# Patient Record
Sex: MALE | Race: WHITE
[De-identification: names, ages, dates, MRNs, and addresses within clinical notes are randomized per-mention and may not be internally consistent; named-entity substitution may affect disease eponyms.]

---

## 2017-01-02 ENCOUNTER — HOSPITAL ENCOUNTER (OUTPATIENT)
Dept: HOSPITAL 47 - EC | Age: 62
Setting detail: OBSERVATION
LOS: 1 days | Discharge: HOME | End: 2017-01-03
Payer: MEDICARE

## 2017-01-02 VITALS — BODY MASS INDEX: 25.7 KG/M2

## 2017-01-02 DIAGNOSIS — J45.909: ICD-10-CM

## 2017-01-02 DIAGNOSIS — Z79.891: ICD-10-CM

## 2017-01-02 DIAGNOSIS — I10: ICD-10-CM

## 2017-01-02 DIAGNOSIS — F12.90: ICD-10-CM

## 2017-01-02 DIAGNOSIS — Z98.84: ICD-10-CM

## 2017-01-02 DIAGNOSIS — B19.20: ICD-10-CM

## 2017-01-02 DIAGNOSIS — Z87.891: ICD-10-CM

## 2017-01-02 DIAGNOSIS — K40.30: Primary | ICD-10-CM

## 2017-01-02 DIAGNOSIS — Z79.899: ICD-10-CM

## 2017-01-02 LAB
ALP SERPL-CCNC: 79 U/L (ref 38–126)
ALT SERPL-CCNC: 38 U/L (ref 21–72)
ANION GAP SERPL CALC-SCNC: 11 MMOL/L
AST SERPL-CCNC: 31 U/L (ref 17–59)
BASOPHILS # BLD AUTO: 0.1 K/UL (ref 0–0.2)
BASOPHILS NFR BLD AUTO: 1 %
BUN SERPL-SCNC: 15 MG/DL (ref 9–20)
CALCIUM SPEC-MCNC: 8.8 MG/DL (ref 8.4–10.2)
CH: 30.9
CHCM: 33.8
CHLORIDE SERPL-SCNC: 106 MMOL/L (ref 98–107)
CO2 SERPL-SCNC: 26 MMOL/L (ref 22–30)
EOSINOPHIL # BLD AUTO: 0.2 K/UL (ref 0–0.7)
EOSINOPHIL NFR BLD AUTO: 3 %
ERYTHROCYTE [DISTWIDTH] IN BLOOD BY AUTOMATED COUNT: 4.43 M/UL (ref 4.3–5.9)
ERYTHROCYTE [DISTWIDTH] IN BLOOD: 13.3 % (ref 11.5–15.5)
GLUCOSE SERPL-MCNC: 96 MG/DL (ref 74–99)
HCT VFR BLD AUTO: 40.8 % (ref 39–53)
HDW: 2.44
HGB BLD-MCNC: 13.3 GM/DL (ref 13–17.5)
LUC NFR BLD AUTO: 1 %
LYMPHOCYTES # SPEC AUTO: 0.8 K/UL (ref 1–4.8)
LYMPHOCYTES NFR SPEC AUTO: 13 %
MCH RBC QN AUTO: 30.1 PG (ref 25–35)
MCHC RBC AUTO-ENTMCNC: 32.7 G/DL (ref 31–37)
MCV RBC AUTO: 92 FL (ref 80–100)
MONOCYTES # BLD AUTO: 0.3 K/UL (ref 0–1)
MONOCYTES NFR BLD AUTO: 4 %
NEUTROPHILS # BLD AUTO: 5.2 K/UL (ref 1.3–7.7)
NEUTROPHILS NFR BLD AUTO: 78 %
NON-AFRICAN AMERICAN GFR(MDRD): >60
POTASSIUM SERPL-SCNC: 4.1 MMOL/L (ref 3.5–5.1)
PROT SERPL-MCNC: 6.4 G/DL (ref 6.3–8.2)
SODIUM SERPL-SCNC: 143 MMOL/L (ref 137–145)
WBC # BLD AUTO: 0.09 10*3/UL
WBC # BLD AUTO: 6.6 K/UL (ref 3.8–10.6)
WBC (PEROX): 7.15

## 2017-01-02 PROCEDURE — 83605 ASSAY OF LACTIC ACID: CPT

## 2017-01-02 PROCEDURE — 80053 COMPREHEN METABOLIC PANEL: CPT

## 2017-01-02 PROCEDURE — 88302 TISSUE EXAM BY PATHOLOGIST: CPT

## 2017-01-02 PROCEDURE — 49650 LAP ING HERNIA REPAIR INIT: CPT

## 2017-01-02 PROCEDURE — 85025 COMPLETE CBC W/AUTO DIFF WBC: CPT

## 2017-01-02 PROCEDURE — 74020: CPT

## 2017-01-02 PROCEDURE — 96374 THER/PROPH/DIAG INJ IV PUSH: CPT

## 2017-01-02 PROCEDURE — 99285 EMERGENCY DEPT VISIT HI MDM: CPT

## 2017-01-02 PROCEDURE — 76882 US LMTD JT/FCL EVL NVASC XTR: CPT

## 2017-01-02 PROCEDURE — 96375 TX/PRO/DX INJ NEW DRUG ADDON: CPT

## 2017-01-02 NOTE — XR
EXAMINATION TYPE: XR abdomen 2V

 

DATE OF EXAM: 1/2/2017 8:03 PM

 

COMPARISON: NONE

 

HISTORY: Right-sided groin pain

 

TECHNIQUE: 3 views, upright and supine

 

FINDINGS: There is no pneumoperitoneum or definite pneumatosis. Gas distended loops of bowel are seen
 in all 4 quadrants, but no definite bowel obstruction. No gas filled bowel loop is noted over the ri
ght groin position.

 

Visualized lung bases and pleural spaces are negative.

 

IMPRESSION: NO DEFINITE ACUTE FINDINGS, NONSPECIFIC SCATTERED FINDINGS AS DESCRIBED.

## 2017-01-02 NOTE — US
EXAMINATION TYPE: US groin extremity RT

 

DATE OF EXAM: 1/2/2017 6:25 PM

 

COMPARISON: NONE

 

CLINICAL HISTORY: Patient states known hernia from 1 year ago that he was able to push back in, lifti
ng weight today and felt "pop" again. Patient is pain with obvious right inguinal bulge.

 

TECHNOLOGIST IMPRESSION:  4.7cm probable hernia with a fluid component seen. Valsalva does not change
 appearance at all, area never moved and no peristalsing seen. With fluid noted I question strangulat
ed hernia within right groin

 

IMPRESSION:  Positive study suggesting incarcerated bowel loop. Would suggest confirmation with CT.

## 2017-01-03 VITALS — RESPIRATION RATE: 16 BRPM

## 2017-01-03 VITALS — TEMPERATURE: 98.2 F | SYSTOLIC BLOOD PRESSURE: 125 MMHG | HEART RATE: 85 BPM | DIASTOLIC BLOOD PRESSURE: 76 MMHG

## 2017-01-03 LAB
ALP SERPL-CCNC: 65 U/L (ref 38–126)
ALT SERPL-CCNC: 34 U/L (ref 21–72)
ANION GAP SERPL CALC-SCNC: 9 MMOL/L
AST SERPL-CCNC: 24 U/L (ref 17–59)
BASOPHILS # BLD AUTO: 0 K/UL (ref 0–0.2)
BASOPHILS NFR BLD AUTO: 0 %
BUN SERPL-SCNC: 12 MG/DL (ref 9–20)
CALCIUM SPEC-MCNC: 8.3 MG/DL (ref 8.4–10.2)
CH: 30.3
CHCM: 32.4
CHLORIDE SERPL-SCNC: 105 MMOL/L (ref 98–107)
CO2 SERPL-SCNC: 30 MMOL/L (ref 22–30)
EOSINOPHIL # BLD AUTO: 0.2 K/UL (ref 0–0.7)
EOSINOPHIL NFR BLD AUTO: 4 %
ERYTHROCYTE [DISTWIDTH] IN BLOOD BY AUTOMATED COUNT: 4.16 M/UL (ref 4.3–5.9)
ERYTHROCYTE [DISTWIDTH] IN BLOOD: 13.1 % (ref 11.5–15.5)
GLUCOSE SERPL-MCNC: 88 MG/DL (ref 74–99)
HCT VFR BLD AUTO: 39.1 % (ref 39–53)
HDW: 2.37
HGB BLD-MCNC: 12.8 GM/DL (ref 13–17.5)
LUC NFR BLD AUTO: 2 %
LYMPHOCYTES # SPEC AUTO: 1.6 K/UL (ref 1–4.8)
LYMPHOCYTES NFR SPEC AUTO: 25 %
MCH RBC QN AUTO: 30.7 PG (ref 25–35)
MCHC RBC AUTO-ENTMCNC: 32.7 G/DL (ref 31–37)
MCV RBC AUTO: 93.9 FL (ref 80–100)
MONOCYTES # BLD AUTO: 0.3 K/UL (ref 0–1)
MONOCYTES NFR BLD AUTO: 4 %
NEUTROPHILS # BLD AUTO: 4.1 K/UL (ref 1.3–7.7)
NEUTROPHILS NFR BLD AUTO: 65 %
NON-AFRICAN AMERICAN GFR(MDRD): >60
POTASSIUM SERPL-SCNC: 4.5 MMOL/L (ref 3.5–5.1)
PROT SERPL-MCNC: 5.7 G/DL (ref 6.3–8.2)
SODIUM SERPL-SCNC: 144 MMOL/L (ref 137–145)
WBC # BLD AUTO: 0.12 10*3/UL
WBC # BLD AUTO: 6.3 K/UL (ref 3.8–10.6)
WBC (PEROX): 6.69

## 2017-01-03 RX ADMIN — POTASSIUM CHLORIDE ONE MLS: 14.9 INJECTION, SOLUTION INTRAVENOUS at 14:13

## 2017-01-03 RX ADMIN — POTASSIUM CHLORIDE ONE MLS: 14.9 INJECTION, SOLUTION INTRAVENOUS at 15:18

## 2017-01-03 RX ADMIN — POTASSIUM CHLORIDE ONE: 14.9 INJECTION, SOLUTION INTRAVENOUS at 17:15

## 2017-01-03 NOTE — P.PCN
Date of Procedure: 01/03/17


Preoperative Diagnosis: 


Right incarcerated inguinal hernia, initial


Postoperative Diagnosis: 


Right strangulated direct inguinal hernia, initial


Procedure(s) Performed: 


Laparoscopic reduction of strangulated right inguinal hernia, repair without 

mesh; partial omentectomy


Anesthesia: GETA, local


Surgeon: Larissa Alvarez


Estimated Blood Loss (ml): 20


Pathology: other (Strangulate a right inguinal hernia sac with omentum)


Condition: stable


Disposition: floor


Operative Findings: 


Incarcerated right inguinal hernia with omentum, no bowel involvement, 

strangulated right inguinal hernia direct containing omentum resected.  Mesh 

repair deferred secondary to contamination of case from strangulated tissue.

## 2017-01-03 NOTE — P.PN
Progress Note - Text





Patient is clinically doing well this evening.  "I feel great."  Patient may be 

discharged.  He is on a pain contract.  He will obtain his pain medication and 

narcotics from his primary care provider.

## 2017-01-03 NOTE — P.DS
Providers


Date of admission: 


01/02/17 19:54





Expected date of discharge: 01/03/17


Attending physician: 


Larissa Alvarez





Primary care physician: 


Kwan Montes








- Discharge Diagnosis(es)


(1) Strangulated inguinal hernia


Status: Acute   





(2) Chronic pain syndrome


Status: Chronic   





(3) Hypertensive heart disease


Status: Chronic   





(4) Right groin pain


Status: Acute   


Hospital Course: 








POSTOPERATIVE DIAGNOSES:


1. Right lower quadrant abdominal pain.


2. Right incarcerated inguinal hernia, initial, direct.


3. Essential hypertension.


4. Attention deficit hyperactivity disorder.


5. Right strangulated inguinal hernia, initial with involvement of omentum.


6. Necrosis of greater omentum at right lower quadrant.





INDICATIONS: The patient is a 61-year-old male with acute swelling and known 

history of right inguinal hernia.  Despite attempts, the right inguinal hernia 

cannot be reduced.  He developed skin to just along the groin consistent which 

translated hernia.  Secondary to his clinical findings, urgent surgical 

intervention was sought.  Surgical options were


discussed including open versus laparoscopic technique and he elected


for laparoscopic technique. Benefits and risks including bleeding,


infection, recurrence, as well as chronic pain and placement of mesh were 

described. Informed consent was obtained. 


Pertinent Studies: 





Abdominal x-ray demonstrates no obstruction.





Scrotal ultrasound demonstrates right groin hernia possibly involving small 

bowel.


Procedures: 








OPERATION:


1. Laparoscopic reduction of strangulated right inguinal hernia.


2. Laparoscopic right inguinal hernia repair via transabdominal approach 

without mesh.


3. Partial omentectomy.








Patient Condition at Discharge: Stable





Plan - Discharge Summary


Discharge Medication List





Albuterol Inhaler [Ventolin Hfa Inhaler] 2 puff INHALATION RT-Q6H PRN 01/02/17 [

History]


Atenolol [Atenolol] 50 mg PO DAILY 01/02/17 [History]


Dextroamphetamine/Amphetamine [Adderall] 30 mg PO BID 01/02/17 [History]


Diazepam [Diazepam] 10 mg PO BID PRN 01/02/17 [History]


Hydrocodone/Acetaminophen [Hydrocodon-Acetaminophn ] 1 tab PO BID 01/02/ 17 [History]


oxyCODONE HCL 30 mg PO 5XD 01/02/17 [History]








Follow up Appointment(s)/Referral(s): 


Kwan Montes MD [Primary Care Provider] - 1-2 days (Office closed at time of 

discharge. Patient to call and schedule follow up appointment.)


Larissa Alvarez MD [STAFF PHYSICIAN] - 01/10/17 (Office closed. Patient to 

call and schedule follow up appointment.)


Patient Instructions/Handouts:  Laparoscopic Herniorrhaphy (DC), Inguinal 

Hernia Repair (DC)


Activity/Diet/Wound Care/Special Instructions: 


No lifting over 4 pounds in 4 weeks.


Discharge Disposition: HOME SELF-CARE

## 2017-01-03 NOTE — P.GSHP
History of Present Illness


H&P Date: 01/03/17








CHIEF COMPLAINT: Incarcerated right inguinal hernia.





HISTORY OF PRESENT ILLNESS: The patient is a 61-year-old male who presented 

with known history of right inguinal hernia. He has no previous history of 

prior groin surgery. He is a previuos gastric bypass patient from over 15 years 

ago. His highest weight was 350-lbs. He has maintained his weight loss of over 

150-lbs. He presented with acute swelling of the right groin and has been 

unable to reduce his hernia. He reports pain. He was admitted secondary to 

increased pain.  








PAST MEDICAL HISTORY: 


Please see list.





PAST SURGICAL HISTORY: 


Please see list.





MEDICATIONS: 


Please see list.





ALLERGIES:  Please see list. 





SOCIAL HISTORY: No illicit drug use





FAMILY HISTORY: No reports of Crohn disease or ulcerative colitis. 





REVIEW OF ORGAN SYSTEMS: 


CONSTITUTIONAL: No reports of fevers or chills.  





PHYSICAL EXAM: 


VITAL SIGNS:  Stable


GENERAL: Well-developed pleasant in no acute distress. 


HEENT: No scleral icterus. Extraocular movements grossly


intact. Moist buccal mucosa. 


NECK: Supple without lymphadenopathy. 


CHEST: Unlabored respirations. Equal bilateral excursions. 


CARDIOVASCULAR: Regular rate and rhythm. Distal 2+ pulses. 


ABDOMEN: Soft. Swelling along left groin with incarcerated inguinal hernia. 


MUSCULOSKELETAL: No clubbing, cyanosis, or edema. 





ASSESSMENT: 


1.  Acute incarcerated right inguinal hernia.





PLAN: 


1. Recommend proceeding with laparoscopic bilateral ingiunal hernia, possible 

open with mesh placement.








Past Medical History


Past Medical History: Asthma, Hypertension


Additional Past Medical History / Comment(s): back pain, hepatitis C


History of Any Multi-Drug Resistant Organisms: None Reported


Past Surgical History: Back Surgery, Joint Replacement, Orthopedic Surgery


Past Psychological History: No Psychological Hx Reported


Smoking Status: Former smoker


Past Alcohol Use History: None Reported


Past Drug Use History: Marijuana


Additional Drug Use History / Comment(s): states occational marijuana use





Medications and Allergies


 Home Medications











 Medication  Instructions  Recorded  Confirmed  Type


 


Albuterol Inhaler [Ventolin Hfa 2 puff INHALATION RT-Q6H PRN 01/02/17 01/02/17 

History





Inhaler]    


 


Atenolol [Atenolol] 50 mg PO DAILY 01/02/17 01/02/17 History


 


Dextroamphetamine/Amphetamine 30 mg PO BID 01/02/17 01/02/17 History





[Adderall]    


 


Diazepam [Diazepam] 10 mg PO BID PRN 01/02/17 01/02/17 History


 


Hydrocodone/Acetaminophen 1 tab PO BID 01/02/17 01/02/17 History





[Hydrocodon-Acetaminophn ]    


 


oxyCODONE HCL 30 mg PO 5XD 01/02/17 01/02/17 History











 Allergies











Allergy/AdvReac Type Severity Reaction Status Date / Time


 


No Known Allergies Allergy   Verified 01/02/17 18:10














Surgical - Exam


 Vital Signs











Temp Pulse Resp BP Pulse Ox


 


 97.0 F L  69   16   158/87   96 


 


 01/02/17 17:43  01/02/17 17:43  01/02/17 17:43  01/02/17 17:43  01/02/17 17:43














Results





- Labs





 01/03/17 07:19





 01/03/17 07:19


 Abnormal Lab Results - Last 24 Hours (Table)











  01/03/17 Range/Units





  07:19 


 


Calcium  8.3 L  (8.4-10.2)  mg/dL


 


Total Protein  5.7 L  (6.3-8.2)  g/dL


 


Albumin  3.3 L  (3.5-5.0)  g/dL








 Diabetes panel











  01/02/17 01/03/17 Range/Units





  20:00 07:19 


 


Sodium  143  144  (137-145)  mmol/L


 


Potassium  4.1  4.5  (3.5-5.1)  mmol/L


 


Chloride  106  105  ()  mmol/L


 


Carbon Dioxide  26  30  (22-30)  mmol/L


 


BUN  15  12  (9-20)  mg/dL


 


Creatinine  0.89  0.93  (0.66-1.25)  mg/dL


 


Glucose  96  88  (74-99)  mg/dL


 


Calcium  8.8  8.3 L  (8.4-10.2)  mg/dL


 


AST  31  24  (17-59)  U/L


 


ALT  38  34  (21-72)  U/L


 


Alkaline Phosphatase  79  65  ()  U/L


 


Total Protein  6.4  5.7 L  (6.3-8.2)  g/dL


 


Albumin  3.8  3.3 L  (3.5-5.0)  g/dL








 Calcium panel











  01/02/17 01/03/17 Range/Units





  20:00 07:19 


 


Calcium  8.8  8.3 L  (8.4-10.2)  mg/dL


 


Albumin  3.8  3.3 L  (3.5-5.0)  g/dL








 Pituitary panel











  01/02/17 01/03/17 Range/Units





  20:00 07:19 


 


Sodium  143  144  (137-145)  mmol/L


 


Potassium  4.1  4.5  (3.5-5.1)  mmol/L


 


Chloride  106  105  ()  mmol/L


 


Carbon Dioxide  26  30  (22-30)  mmol/L


 


BUN  15  12  (9-20)  mg/dL


 


Creatinine  0.89  0.93  (0.66-1.25)  mg/dL


 


Glucose  96  88  (74-99)  mg/dL


 


Calcium  8.8  8.3 L  (8.4-10.2)  mg/dL








 Adrenal panel











  01/02/17 01/03/17 Range/Units





  20:00 07:19 


 


Sodium  143  144  (137-145)  mmol/L


 


Potassium  4.1  4.5  (3.5-5.1)  mmol/L


 


Chloride  106  105  ()  mmol/L


 


Carbon Dioxide  26  30  (22-30)  mmol/L


 


BUN  15  12  (9-20)  mg/dL


 


Creatinine  0.89  0.93  (0.66-1.25)  mg/dL


 


Glucose  96  88  (74-99)  mg/dL


 


Calcium  8.8  8.3 L  (8.4-10.2)  mg/dL


 


Total Bilirubin  0.6  0.8  (0.2-1.3)  mg/dL


 


AST  31  24  (17-59)  U/L


 


ALT  38  34  (21-72)  U/L


 


Alkaline Phosphatase  79  65  ()  U/L


 


Total Protein  6.4  5.7 L  (6.3-8.2)  g/dL


 


Albumin  3.8  3.3 L  (3.5-5.0)  g/dL

## 2017-01-29 NOTE — P.OP
Date of Procedure: 01/03/17


Description of Procedure: 








SURGEON:  CEASAR GUILLORY MD


ASSISTANT:  None.


PREOPERATIVE DIAGNOSIS:  


1. Right lower quadrant abdominal pain.


2. Right incarcerated inguinal hernia, initial.


3. Essential hypertension.


4. Attention deficit hyperactivity disorder.





POSTOPERATIVE DIAGNOSES:


1. Right lower quadrant abdominal pain.


2. Right incarcerated inguinal hernia, initial, direct.


3. Essential hypertension.


4. Attention deficit hyperactivity disorder.


5. Right strangulated inguinal hernia, initial with involvement of omentum.


6. Necrosis of greater omentum at right lower quadrant.





OPERATION:


1. Laparoscopic reduction of strangulated right inguinal hernia.


2. Laparoscopic right inguinal hernia repair via transabdominal approach 

without mesh.


3. Partial omentectomy.





IMPLANTS: None.


ANESTHESIA: General with 85 mL Exparel Sensorcaine and normal saline mixture.


ESTIMATED BLOOD LOSS: 20 mL. 


SPECIMENS: 


1. Right inguinal hernia sac and contents. 


2. Partial omentectomy.





COMPLICATIONS: None. 





FINDINGS:


1. Incarcerated right inguinal hernia with omentum and without bowel 

involvement.


2. Strangulated right inguinal hernia direct containing omentum resected.  


3. Mesh repair deferred secondary to contamination of case from strangulated 

tissue.





INDICATIONS: The patient is a 61-year-old male with acute swelling and known 

history of right inguinal hernia.  Despite attempts, the right inguinal hernia 

cannot be reduced.  He developed skin to just along the groin consistent which 

translated hernia.  Secondary to his clinical findings, urgent surgical 

intervention was sought.  Surgical options were


discussed including open versus laparoscopic technique and he elected


for laparoscopic technique. Benefits and risks including bleeding,


infection, recurrence, as well as chronic pain and placement of mesh were 

described. Informed consent was obtained. 





DESCRIPTION: The patient had voided earlier and was brought into the operating 

room, laid in


supine position. After general induction, the abdomen was prepped and draped in 

standard sterile fashion and


placement of Ioban draping. Prior to incision, a timeout protocol was


confirmed with surgical team regarding the patient's name including


procedures to be performed. 





Via the left upper abdomen a 0 degree 5 mm laparoscopic trocar entry was


performed after anesthetizing the skin with 0.25% Marcaine with


epinephrine and incised using a #11 blade. An optical 


view trocar entry was performed.  Diagnostic laparoscopy demonstrated


strangulated right inguinal hernia involving omentum. No inguinal hernia along 

the left side.


The patient tolerated insufflation to 15 mm Hg pressure.





Next, one 5 mm trocar and one 11-mm trocar were placed along the left lateral 

abdominal


wall to address the right inguinal defect. The patient was then


placed in Trendelenburg position with the right side up. 





The right inguinal peritoneum was scored medial to the epigastric vessels 


using electro- Bovie cautery including a combination of cordless Harmonic


scalpel. Using continuous retraction as well as external pressure along


the right groin, the strangulated omentum of the inguinal canal was reduced into


the peritoneal cavity consistent with his palpable mass and swelling


of the right external inguinal ring. The specimen was


resected and passed off for further pathological analysis.


A partial appendectomy was performed as the omentum was closed. 


The size of the defect was 3 cm with intraoperative films obtained.





Using an Endo stitch and 2-0 Surgidac, the peritoneal defect of


the right inguinal hernia site was closed using a pursestring suture of 2-0


Surgidac with a Lapra-Ty. The defect was found to be completely


closed after removal of the external pressure along the right groin. Complete 

reduction of the


right inguinal hernia was confirmed.





As necrotic tissue was removed from the abdomen, mesh placement was deferred to 

prevent implant infection.





The 10-mm port site was closed using Josue Traore 0 Vicryl in an interrupted 

fashion.





Hemostasis was excellent throughout the case. All instruments and


pneumoperitoneum were evacuated from the abdominal cavity. 





All port sites were infiltrated with Exparel and biliateral inguinal 


field block was also placed.  The incisions were reapproximated 


using 4-0 Monocryl in an interrupted subcuticular fashion. 





Dermabond was applied to the skin. 





At the end of the procedure, the needle sponge and instrument count had


been verified correct by surgical technician. The patient had tolerated


the procedure well and was taken to the postanesthesia care unit in


stable condition.

## 2017-06-16 ENCOUNTER — HOSPITAL ENCOUNTER (OUTPATIENT)
Dept: HOSPITAL 47 - LABWHC1 | Age: 62
Discharge: HOME | End: 2017-06-16
Payer: MEDICARE

## 2017-06-16 DIAGNOSIS — B18.2: Primary | ICD-10-CM

## 2017-06-16 LAB
ALP SERPL-CCNC: 78 U/L (ref 38–126)
ALT SERPL-CCNC: 30 U/L (ref 21–72)
ANION GAP SERPL CALC-SCNC: 12 MMOL/L
AST SERPL-CCNC: 26 U/L (ref 17–59)
BASOPHILS # BLD AUTO: 0.1 K/UL (ref 0–0.2)
BASOPHILS NFR BLD AUTO: 1 %
BUN SERPL-SCNC: 17 MG/DL (ref 9–20)
CALCIUM SPEC-MCNC: 10.1 MG/DL (ref 8.4–10.2)
CH: 29.3
CHCM: 32.8
CHLORIDE SERPL-SCNC: 105 MMOL/L (ref 98–107)
CO2 SERPL-SCNC: 28 MMOL/L (ref 22–30)
EOSINOPHIL # BLD AUTO: 0.3 K/UL (ref 0–0.7)
EOSINOPHIL NFR BLD AUTO: 3 %
ERYTHROCYTE [DISTWIDTH] IN BLOOD BY AUTOMATED COUNT: 4.96 M/UL (ref 4.3–5.9)
ERYTHROCYTE [DISTWIDTH] IN BLOOD: 13.8 % (ref 11.5–15.5)
GLUCOSE SERPL-MCNC: 114 MG/DL (ref 74–99)
HCT VFR BLD AUTO: 44.5 % (ref 39–53)
HDW: 2.46
HGB BLD-MCNC: 14.6 GM/DL (ref 13–17.5)
LUC NFR BLD AUTO: 2 %
LYMPHOCYTES # SPEC AUTO: 1.8 K/UL (ref 1–4.8)
LYMPHOCYTES NFR SPEC AUTO: 23 %
MCH RBC QN AUTO: 29.4 PG (ref 25–35)
MCHC RBC AUTO-ENTMCNC: 32.7 G/DL (ref 31–37)
MCV RBC AUTO: 89.8 FL (ref 80–100)
MONOCYTES # BLD AUTO: 0.4 K/UL (ref 0–1)
MONOCYTES NFR BLD AUTO: 5 %
NEUTROPHILS # BLD AUTO: 5.3 K/UL (ref 1.3–7.7)
NEUTROPHILS NFR BLD AUTO: 67 %
NON-AFRICAN AMERICAN GFR(MDRD): >60
POTASSIUM SERPL-SCNC: 5.5 MMOL/L (ref 3.5–5.1)
PROT SERPL-MCNC: 7.7 G/DL (ref 6.3–8.2)
SODIUM SERPL-SCNC: 145 MMOL/L (ref 137–145)
WBC # BLD AUTO: 0.14 10*3/UL
WBC # BLD AUTO: 7.9 K/UL (ref 3.8–10.6)
WBC (PEROX): 7.74

## 2017-06-16 PROCEDURE — 80053 COMPREHEN METABOLIC PANEL: CPT

## 2017-06-16 PROCEDURE — 87522 HEPATITIS C REVRS TRNSCRPJ: CPT

## 2017-06-16 PROCEDURE — 85025 COMPLETE CBC W/AUTO DIFF WBC: CPT

## 2017-06-16 PROCEDURE — 36415 COLL VENOUS BLD VENIPUNCTURE: CPT

## 2017-06-24 ENCOUNTER — HOSPITAL ENCOUNTER (EMERGENCY)
Dept: HOSPITAL 47 - EC | Age: 62
Discharge: HOME | End: 2017-06-24
Payer: MEDICARE

## 2017-06-24 VITALS — HEART RATE: 59 BPM | SYSTOLIC BLOOD PRESSURE: 169 MMHG | TEMPERATURE: 97.6 F | DIASTOLIC BLOOD PRESSURE: 73 MMHG

## 2017-06-24 VITALS — RESPIRATION RATE: 18 BRPM

## 2017-06-24 DIAGNOSIS — R11.2: ICD-10-CM

## 2017-06-24 DIAGNOSIS — N13.2: Primary | ICD-10-CM

## 2017-06-24 DIAGNOSIS — Z87.891: ICD-10-CM

## 2017-06-24 DIAGNOSIS — Z79.899: ICD-10-CM

## 2017-06-24 DIAGNOSIS — I10: ICD-10-CM

## 2017-06-24 DIAGNOSIS — Z79.891: ICD-10-CM

## 2017-06-24 LAB
ALP SERPL-CCNC: 79 U/L (ref 38–126)
ALT SERPL-CCNC: 24 U/L (ref 21–72)
ANION GAP SERPL CALC-SCNC: 9 MMOL/L
AST SERPL-CCNC: 29 U/L (ref 17–59)
BASOPHILS # BLD AUTO: 0 K/UL (ref 0–0.2)
BASOPHILS NFR BLD AUTO: 0 %
BUN SERPL-SCNC: 16 MG/DL (ref 9–20)
CALCIUM SPEC-MCNC: 9 MG/DL (ref 8.4–10.2)
CH: 29.6
CHCM: 32.4
CHLORIDE SERPL-SCNC: 104 MMOL/L (ref 98–107)
CO2 SERPL-SCNC: 28 MMOL/L (ref 22–30)
EOSINOPHIL # BLD AUTO: 0.2 K/UL (ref 0–0.7)
EOSINOPHIL NFR BLD AUTO: 2 %
ERYTHROCYTE [DISTWIDTH] IN BLOOD BY AUTOMATED COUNT: 4.77 M/UL (ref 4.3–5.9)
ERYTHROCYTE [DISTWIDTH] IN BLOOD: 13.9 % (ref 11.5–15.5)
GLUCOSE SERPL-MCNC: 145 MG/DL (ref 74–99)
HCT VFR BLD AUTO: 43.7 % (ref 39–53)
HDW: 2.5
HGB BLD-MCNC: 14.2 GM/DL (ref 13–17.5)
LUC NFR BLD AUTO: 1 %
LYMPHOCYTES # SPEC AUTO: 1 K/UL (ref 1–4.8)
LYMPHOCYTES NFR SPEC AUTO: 9 %
MCH RBC QN AUTO: 29.8 PG (ref 25–35)
MCHC RBC AUTO-ENTMCNC: 32.6 G/DL (ref 31–37)
MCV RBC AUTO: 91.6 FL (ref 80–100)
MONOCYTES # BLD AUTO: 0.5 K/UL (ref 0–1)
MONOCYTES NFR BLD AUTO: 4 %
NEUTROPHILS # BLD AUTO: 9.3 K/UL (ref 1.3–7.7)
NEUTROPHILS NFR BLD AUTO: 84 %
NON-AFRICAN AMERICAN GFR(MDRD): >60
PARTICLE COUNT: 3202
PH UR: 5.5 [PH] (ref 5–8)
POTASSIUM SERPL-SCNC: 4.9 MMOL/L (ref 3.5–5.1)
PROT SERPL-MCNC: 7 G/DL (ref 6.3–8.2)
RBC UR QL: >182 /HPF (ref 0–5)
SODIUM SERPL-SCNC: 141 MMOL/L (ref 137–145)
SP GR UR: 1.01 (ref 1–1.03)
UA BILLING (MACRO VS. MICRO): (no result)
UROBILINOGEN UR QL STRIP: <2 MG/DL (ref ?–2)
WBC # BLD AUTO: 0.11 10*3/UL
WBC # BLD AUTO: 11.1 K/UL (ref 3.8–10.6)
WBC #/AREA URNS HPF: 13 /HPF (ref 0–5)
WBC (PEROX): 10.97

## 2017-06-24 PROCEDURE — 36415 COLL VENOUS BLD VENIPUNCTURE: CPT

## 2017-06-24 PROCEDURE — 80053 COMPREHEN METABOLIC PANEL: CPT

## 2017-06-24 PROCEDURE — 99284 EMERGENCY DEPT VISIT MOD MDM: CPT

## 2017-06-24 PROCEDURE — 96374 THER/PROPH/DIAG INJ IV PUSH: CPT

## 2017-06-24 PROCEDURE — 96375 TX/PRO/DX INJ NEW DRUG ADDON: CPT

## 2017-06-24 PROCEDURE — 81001 URINALYSIS AUTO W/SCOPE: CPT

## 2017-06-24 PROCEDURE — 85025 COMPLETE CBC W/AUTO DIFF WBC: CPT

## 2017-06-24 PROCEDURE — 74150 CT ABDOMEN W/O CONTRAST: CPT
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
## 2017-06-24 NOTE — CT
EXAMINATION TYPE: CT renal stones wo con

 

DATE OF EXAM: 6/24/2017

 

HISTORY: Patient complains of left flank pain.  Patient denies history of prior stones.

 

CT DLP: 302.5 mGycm.  Automated Exposure Control for Dose Reduction was Utilized.

 

TECHNIQUE:  CT scan of the abdomen and pelvis is performed without oral or IV contrast.

 

COMPARISON: NONE

 

FINDINGS:  Lung bases are clear of consolidation. There is no pleural effusion. There are surgical cl
ips apparently from bariatric surgery. There is hiatal hernia. Heart size is normal.

 

Liver shows no focal defect. There are small calcified splenic granulomata. There is no sign of pancr
eatic mass. Bile ducts are not dilated. Gallbladder appears normal.

 

There are multiple bilateral renal calculi that measure up to 4 mm. There is left-sided hydronephrosi
s and perinephric stranding. There is a 5 mm calcification in the pelvis on the left side that is pro
bably a stone at the ureterovesical junction. Urinary bladder wall is thickened. I see no intestinal 
wall thickening. Appendix is not seen. There is no sign of appendicitis.

 

I see no bony destructive process. There are moderate spondylotic changes in the lumbar spine from L2
 to L5.

 

IMPRESSION: 

Numerous bilateral renal calculi.

 

Left-sided hydronephrosis and perinephric edema with apparent stone in the distal left ureter. Urinar
y bladder wall thickening suggestive of nonspecific cystitis. Hiatal hernia.

## 2017-06-30 ENCOUNTER — HOSPITAL ENCOUNTER (EMERGENCY)
Dept: HOSPITAL 47 - EC | Age: 62
Discharge: HOME | End: 2017-06-30
Payer: MEDICARE

## 2017-06-30 VITALS
DIASTOLIC BLOOD PRESSURE: 86 MMHG | HEART RATE: 86 BPM | TEMPERATURE: 97.6 F | SYSTOLIC BLOOD PRESSURE: 139 MMHG | RESPIRATION RATE: 20 BRPM

## 2017-06-30 DIAGNOSIS — Z87.891: ICD-10-CM

## 2017-06-30 DIAGNOSIS — I10: ICD-10-CM

## 2017-06-30 DIAGNOSIS — M54.9: ICD-10-CM

## 2017-06-30 DIAGNOSIS — M25.572: ICD-10-CM

## 2017-06-30 DIAGNOSIS — Z79.891: ICD-10-CM

## 2017-06-30 DIAGNOSIS — M25.571: ICD-10-CM

## 2017-06-30 DIAGNOSIS — Z79.899: ICD-10-CM

## 2017-06-30 DIAGNOSIS — G89.4: Primary | ICD-10-CM

## 2017-06-30 PROCEDURE — 96372 THER/PROPH/DIAG INJ SC/IM: CPT

## 2017-06-30 PROCEDURE — 99284 EMERGENCY DEPT VISIT MOD MDM: CPT

## 2017-06-30 NOTE — ED
General Adult HPI





- General


Chief complaint: Back Pain/Injury


Stated complaint: back pain/fall


Time Seen by Provider: 06/30/17 02:23


Source: patient, RN notes reviewed, old records reviewed


Mode of arrival: wheelchair


Limitations: no limitations





- History of Present Illness


Initial comments: 





This is a 62-year-old male to the ER for evaluation pain.  Severe back pain, 

chronic back pain.  Patient admits pain is chronic in nature, states is not 

helping for pain.  No new trauma.  This is on also giving stabbing pain in his 

ankles and shins.  States all pain is chronic in nature did awake him from 

sleep tonight, denies drugs or alcohol





- Related Data


 Home Medications











 Medication  Instructions  Recorded  Confirmed


 


Albuterol Inhaler [Ventolin Hfa 2 puff INHALATION RT-Q6H PRN 01/02/17 06/24/17





Inhaler]   


 


Atenolol [Atenolol] 50 mg PO DAILY 01/02/17 06/24/17


 


Diazepam [Diazepam] 10 mg PO BID PRN 01/02/17 06/24/17


 


Hydrocodone/Acetaminophen 1 tab PO TID PRN 01/02/17 06/24/17





[Hydrocodon-Acetaminophn ]   


 


oxyCODONE HCL 30 mg PO 5XD 01/02/17 06/24/17








 Previous Rx's











 Medication  Instructions  Recorded


 


Ondansetron Odt [Zofran Odt] 4 mg PO Q8HR PRN #10 tab 06/24/17


 


Tamsulosin [Flomax] 0.4 mg PO DAILY #7 cap 06/24/17


 


oxyCODONE HCL 30 mg PO Q6HR PRN #15 tab 06/24/17











 Allergies











Allergy/AdvReac Type Severity Reaction Status Date / Time


 


No Known Allergies Allergy   Verified 06/30/17 01:48














Review of Systems


ROS Statement: 


Those systems with pertinent positive or pertinent negative responses have been 

documented in the HPI.





ROS Other: All systems not noted in ROS Statement are negative.





Past Medical History


Past Medical History: Asthma, Hypertension


Additional Past Medical History / Comment(s): back pain, hepatitis C


History of Any Multi-Drug Resistant Organisms: None Reported


Past Surgical History: Back Surgery, Joint Replacement, Orthopedic Surgery


Past Psychological History: No Psychological Hx Reported


Smoking Status: Former smoker


Past Alcohol Use History: None Reported


Past Drug Use History: None Reported





General Exam


Limitations: no limitations


General appearance: alert, in no apparent distress


Head exam: Present: atraumatic, normocephalic, normal inspection


Eye exam: Present: normal appearance, PERRL, EOMI.  Absent: scleral icterus, 

conjunctival injection, periorbital swelling


ENT exam: Present: normal exam, mucous membranes moist


Neck exam: Present: normal inspection.  Absent: tenderness, meningismus, 

lymphadenopathy


Respiratory exam: Present: normal lung sounds bilaterally.  Absent: respiratory 

distress, wheezes, rales, rhonchi, stridor


Cardiovascular Exam: Present: regular rate, normal rhythm, normal heart sounds.

  Absent: systolic murmur, diastolic murmur, rubs, gallop, clicks


GI/Abdominal exam: Present: soft, normal bowel sounds.  Absent: distended, 

tenderness, guarding, rebound, rigid


Extremities exam: Present: normal inspection, full ROM, normal capillary 

refill.  Absent: tenderness, pedal edema, joint swelling, calf tenderness


Back exam: Present: normal inspection


Neurological exam: Present: alert, oriented X3, CN II-XII intact


Psychiatric exam: Present: normal affect, normal mood


Skin exam: Present: warm, dry, intact, normal color.  Absent: rash





Course


 Vital Signs











  06/30/17 06/30/17





  01:46 02:58


 


Temperature 97.6 F 97.6 F


 


Pulse Rate 86 86


 


Respiratory 20 20





Rate  


 


Blood Pressure 139/86 139/86


 


O2 Sat by Pulse 96 96





Oximetry  














Medical Decision Making





- Medical Decision Making





59 female the ER with chronic pain chronic back pain, multiple evaluations,.  

This point is improved.  Patient can be discharged home





Disposition


Clinical Impression: 


 Chronic pain syndrome





Disposition: HOME SELF-CARE


Condition: Good


Instructions:  Acute Low Back Pain (ED)


Referrals: 


Kwan Montes MD [Primary Care Provider] - 1-2 days

## 2017-07-21 ENCOUNTER — HOSPITAL ENCOUNTER (OUTPATIENT)
Dept: HOSPITAL 47 - RADMRIMAIN | Age: 62
Discharge: HOME | End: 2017-07-21
Payer: MEDICARE

## 2017-07-21 DIAGNOSIS — M51.26: ICD-10-CM

## 2017-07-21 DIAGNOSIS — M99.73: Primary | ICD-10-CM

## 2017-07-21 DIAGNOSIS — M46.86: ICD-10-CM

## 2017-07-21 DIAGNOSIS — M47.816: ICD-10-CM

## 2017-07-21 PROCEDURE — 72158 MRI LUMBAR SPINE W/O & W/DYE: CPT

## 2017-07-21 NOTE — MR
EXAMINATION TYPE: MR lumbar spine wo/w con

 

DATE OF EXAM: 7/21/2017

 

COMPARISON: 8/26/2016

 

HISTORY: Back and Leg Pain, Previous MRI Lumbar on PACS

 

TECHNIQUE: 

Multiplanar, multisequence images of the lumbar spine were acquired utilizing 15 mL intravenous Multi
Zhen gadolinium contrast.    

 

The lumbar vertebra have normal alignment. There is severe narrowing of the disc spaces from L2 to L5
 with spurring of the endplates. There is no compression fracture. There is posterior endplate spur f
ormation and mild disc herniation at L3-4 and L4-5. There is a relatively large spinal canal and no s
ignificant spinal stenosis is seen. There is no paraspinal mass. The contrast images show no patholog
ic enhancement. There is bilateral L4-5 neural foraminal stenosis due to facet arthropathy and endpla
te spur formation.

IMPRESSION:

Moderate multilevel spondylosis from L4 to L5. There is posterior disc herniation at L4-5 that is inc
reased slightly compared to old exam but no significant spinal stenosis. There is moderately severe b
ilateral L4-5 neural foraminal stenosis which appears unchanged. No fracture.   stable multilevel hyp
ertrophic facet arthropathy.

## 2017-12-19 ENCOUNTER — HOSPITAL ENCOUNTER (OUTPATIENT)
Dept: HOSPITAL 47 - EC | Age: 62
Setting detail: OBSERVATION
LOS: 1 days | Discharge: HOME | End: 2017-12-20
Payer: MEDICARE

## 2017-12-19 DIAGNOSIS — Z79.899: ICD-10-CM

## 2017-12-19 DIAGNOSIS — M54.41: ICD-10-CM

## 2017-12-19 DIAGNOSIS — G47.419: ICD-10-CM

## 2017-12-19 DIAGNOSIS — K21.9: ICD-10-CM

## 2017-12-19 DIAGNOSIS — H10.9: ICD-10-CM

## 2017-12-19 DIAGNOSIS — E66.9: ICD-10-CM

## 2017-12-19 DIAGNOSIS — B19.20: ICD-10-CM

## 2017-12-19 DIAGNOSIS — I10: ICD-10-CM

## 2017-12-19 DIAGNOSIS — R55: Primary | ICD-10-CM

## 2017-12-19 DIAGNOSIS — M54.2: ICD-10-CM

## 2017-12-19 DIAGNOSIS — G89.29: ICD-10-CM

## 2017-12-19 DIAGNOSIS — Z87.891: ICD-10-CM

## 2017-12-19 DIAGNOSIS — Z82.49: ICD-10-CM

## 2017-12-19 DIAGNOSIS — M46.92: ICD-10-CM

## 2017-12-19 DIAGNOSIS — J45.909: ICD-10-CM

## 2017-12-19 DIAGNOSIS — F41.9: ICD-10-CM

## 2017-12-19 DIAGNOSIS — Z98.84: ICD-10-CM

## 2017-12-19 DIAGNOSIS — F11.20: ICD-10-CM

## 2017-12-19 DIAGNOSIS — K76.9: ICD-10-CM

## 2017-12-19 DIAGNOSIS — Z87.11: ICD-10-CM

## 2017-12-19 LAB
ALBUMIN SERPL-MCNC: 4.3 G/DL (ref 3.5–5)
ALP SERPL-CCNC: 89 U/L (ref 38–126)
ALT SERPL-CCNC: 301 U/L (ref 21–72)
ANION GAP SERPL CALC-SCNC: 11 MMOL/L
APTT BLD: 24.4 SEC (ref 22–30)
AST SERPL-CCNC: 152 U/L (ref 17–59)
BASOPHILS # BLD AUTO: 0 K/UL (ref 0–0.2)
BASOPHILS NFR BLD AUTO: 1 %
BUN SERPL-SCNC: 24 MG/DL (ref 9–20)
CALCIUM SPEC-MCNC: 9.3 MG/DL (ref 8.4–10.2)
CHLORIDE SERPL-SCNC: 104 MMOL/L (ref 98–107)
CK SERPL-CCNC: 174 U/L (ref 55–170)
CO2 SERPL-SCNC: 26 MMOL/L (ref 22–30)
EOSINOPHIL # BLD AUTO: 0.2 K/UL (ref 0–0.7)
EOSINOPHIL NFR BLD AUTO: 3 %
ERYTHROCYTE [DISTWIDTH] IN BLOOD BY AUTOMATED COUNT: 4.45 M/UL (ref 4.3–5.9)
ERYTHROCYTE [DISTWIDTH] IN BLOOD: 15.3 % (ref 11.5–15.5)
GLUCOSE BLD-MCNC: 134 MG/DL (ref 75–99)
GLUCOSE BLD-MCNC: 75 MG/DL (ref 75–99)
GLUCOSE SERPL-MCNC: 69 MG/DL (ref 74–99)
HCT VFR BLD AUTO: 39.1 % (ref 39–53)
HGB BLD-MCNC: 12.4 GM/DL (ref 13–17.5)
INR PPP: 1.1 (ref ?–1.2)
LYMPHOCYTES # SPEC AUTO: 1.6 K/UL (ref 1–4.8)
LYMPHOCYTES NFR SPEC AUTO: 24 %
MAGNESIUM SPEC-SCNC: 2.2 MG/DL (ref 1.6–2.3)
MCH RBC QN AUTO: 27.8 PG (ref 25–35)
MCHC RBC AUTO-ENTMCNC: 31.6 G/DL (ref 31–37)
MCV RBC AUTO: 87.8 FL (ref 80–100)
MONOCYTES # BLD AUTO: 0.5 K/UL (ref 0–1)
MONOCYTES NFR BLD AUTO: 7 %
NEUTROPHILS # BLD AUTO: 4.2 K/UL (ref 1.3–7.7)
NEUTROPHILS NFR BLD AUTO: 63 %
PH UR: 5 [PH] (ref 5–8)
PLATELET # BLD AUTO: 213 K/UL (ref 150–450)
POTASSIUM SERPL-SCNC: 4.6 MMOL/L (ref 3.5–5.1)
PROT SERPL-MCNC: 7.4 G/DL (ref 6.3–8.2)
PT BLD: 10.9 SEC (ref 9–12)
SODIUM SERPL-SCNC: 141 MMOL/L (ref 137–145)
SP GR UR: 1.01 (ref 1–1.03)
TROPONIN I SERPL-MCNC: <0.012 NG/ML (ref 0–0.03)
UROBILINOGEN UR QL STRIP: <2 MG/DL (ref ?–2)
WBC # BLD AUTO: 6.6 K/UL (ref 3.8–10.6)

## 2017-12-19 PROCEDURE — 84484 ASSAY OF TROPONIN QUANT: CPT

## 2017-12-19 PROCEDURE — 96360 HYDRATION IV INFUSION INIT: CPT

## 2017-12-19 PROCEDURE — 71020: CPT

## 2017-12-19 PROCEDURE — 72125 CT NECK SPINE W/O DYE: CPT

## 2017-12-19 PROCEDURE — 93306 TTE W/DOPPLER COMPLETE: CPT

## 2017-12-19 PROCEDURE — 85730 THROMBOPLASTIN TIME PARTIAL: CPT

## 2017-12-19 PROCEDURE — 70450 CT HEAD/BRAIN W/O DYE: CPT

## 2017-12-19 PROCEDURE — 80053 COMPREHEN METABOLIC PANEL: CPT

## 2017-12-19 PROCEDURE — 82553 CREATINE MB FRACTION: CPT

## 2017-12-19 PROCEDURE — 85610 PROTHROMBIN TIME: CPT

## 2017-12-19 PROCEDURE — 96361 HYDRATE IV INFUSION ADD-ON: CPT

## 2017-12-19 PROCEDURE — 93005 ELECTROCARDIOGRAM TRACING: CPT

## 2017-12-19 PROCEDURE — 83735 ASSAY OF MAGNESIUM: CPT

## 2017-12-19 PROCEDURE — 81003 URINALYSIS AUTO W/O SCOPE: CPT

## 2017-12-19 PROCEDURE — 82550 ASSAY OF CK (CPK): CPT

## 2017-12-19 PROCEDURE — 36415 COLL VENOUS BLD VENIPUNCTURE: CPT

## 2017-12-19 PROCEDURE — 95819 EEG AWAKE AND ASLEEP: CPT

## 2017-12-19 PROCEDURE — 85025 COMPLETE CBC W/AUTO DIFF WBC: CPT

## 2017-12-19 PROCEDURE — 99285 EMERGENCY DEPT VISIT HI MDM: CPT

## 2017-12-19 RX ADMIN — CEFAZOLIN SCH MLS/HR: 330 INJECTION, POWDER, FOR SOLUTION INTRAMUSCULAR; INTRAVENOUS at 11:22

## 2017-12-19 RX ADMIN — HYDROCODONE BITARTRATE AND ACETAMINOPHEN PRN EACH: 5; 325 TABLET ORAL at 16:01
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
## 2017-12-19 NOTE — XR
EXAMINATION TYPE: XR chest 2V

 

DATE OF EXAM: 12/19/2017

 

COMPARISON: 12/7/2015

 

HISTORY: Shortness of breath

 

TECHNIQUE:  Frontal and lateral views of the chest are obtained.

 

FINDINGS:

 

Scattered senescent parenchymal changes noted. Hyperinflation compatible with COPD. Right basilar alpesh
ear atelectasis.

 

No evidence for infiltrate. No evidence for atelectasis.

 

Heart size is stable.

 

Mediastinal structures are stable and grossly unremarkable.

 

No evidence for hilar prominence.

 

Degenerative changes dorsal spine. 

 

IMPRESSION:

1. No evidence for acute pulmonary disease.

## 2017-12-19 NOTE — CT
EXAMINATION TYPE: CT brain cspine wo con

 

DATE OF EXAM: 12/19/2017

 

COMPARISON: 12/6/2017

 

HISTORY: Syncope, fell backwards hitting head

 

CT DLP: 1341.6 mGycm, Automated exposure control for dose reduction was used.

 

CONTRAST: Patient injected with 0 mL of Omnipaque 300.

 

CT of the brain is performed utilizing 3 mm thick sections through the posterior fossa and 3 mm thick
 sections through the remaining calvarium.  

 

Study is performed within 24 hours of arrival to the hospital.

 

 No abnormal hyperdensity is present to suggest an acute intracranial hemorrhage.

No mass lesion is evident.

No acute infarcts are evident.

Ventricles and sulci are appropriate for the patient age.  

 

Some minimal fluid may be within the right maxillary sinus. Remaining paranasal sinuses are clear. Ma
stoid air cells are clear. No acute fractures are evident.

 

IMPRESSIONS:

1. No acute intracranial process.

2. Resolving right maxillary sinusitis

 

CT cervical spine.

 

COMPARISON: None

 

CT of the cervical spine is performed in the axial plane at 2 mm thick sections.  Reconstructed image
s in the coronal, and sagittal plane are reviewed on the computer. 

 

No acute fractures are evident.

There is kyphosis centered at C5. Posterior longitudinal ligament calcification is present. Spinal ca
nal narrowing without stenosis by measurement criteria is present. Note is made of facet degenerative
 changes. Uncovertebral joint hypertrophy is present causing moderate narrowing on the left at C4-5 b
ilateral foraminal narrowing C5-6 and moderate narrowing at C6-7. Endplate spurring and posterior stephan
gitudinal ligament calcification in the right paracentral region at C5-6 has moderate anterior thecal
 sac compression. Some cord contact is present with deformity. There is likely exiting nerve root imp
ingement.

There is loss of disc height C4-5 C5-6 C6-7.

No acute compression deformities are identified.

 

IMPRESSIONS:

1. Degenerative disc changes, kyphosis and uncovertebral joint hypertrophy through the mid and lower 
cervical spine.

2. Right paracentral endplate spurring and longitudinal ligament calcification contributing to modera
te right paraspinal stenosis with spinal cord deformity and suspected nerve root impingement C5-6.

3. No acute abnormality.

## 2017-12-20 VITALS — RESPIRATION RATE: 16 BRPM

## 2017-12-20 VITALS — TEMPERATURE: 97.8 F | HEART RATE: 70 BPM | SYSTOLIC BLOOD PRESSURE: 130 MMHG | DIASTOLIC BLOOD PRESSURE: 72 MMHG

## 2017-12-20 LAB
GLUCOSE BLD-MCNC: 107 MG/DL (ref 75–99)
GLUCOSE BLD-MCNC: 76 MG/DL (ref 75–99)
GLUCOSE BLD-MCNC: 88 MG/DL (ref 75–99)

## 2017-12-20 RX ADMIN — TOBRAMYCIN SCH DROPS: 3 SOLUTION OPHTHALMIC at 12:24

## 2017-12-20 RX ADMIN — HYDROCODONE BITARTRATE AND ACETAMINOPHEN PRN EACH: 5; 325 TABLET ORAL at 06:02

## 2017-12-20 RX ADMIN — CEFAZOLIN SCH MLS/HR: 330 INJECTION, POWDER, FOR SOLUTION INTRAMUSCULAR; INTRAVENOUS at 12:24

## 2017-12-20 RX ADMIN — TOBRAMYCIN SCH: 3 SOLUTION OPHTHALMIC at 17:37

## 2017-12-20 NOTE — EEG
ELECTROENCEPHALOGRAM REPORT



DATE OF SERVICE:

12/20/2017



REASON FOR TESTING:

Syncope.



DESCRIPTION OF THE PROCEDURE:

This EEG was performed using a 21-channel digital electroencephalograph, following

international 10-20 system.



DESCRIPTION OF THE RECORDING:

From the beginning of the tracing, and with patient's eyes closed, the background

rhythm was mostly consisting of 9-10 Hz alpha frequency in the posterior occipital

leads.  No obvious asymmetry is seen.  Occasional muscle artifacts and movement

artifacts are seen.  Photic stimulation was performed with a good driving response

seen.  No pathological waves were elicited.  Hyperventilation was not performed.  The

patient remains awake throughout the tracing.  No epileptiform discharges were seen.

His EKG lead showed a regular rate and rhythm.



INTERPRETATION:

This awake EEG can be considered within normal limits.  There is no asymmetry seen.  No

epileptiform discharges were noticed.  The absence of epileptiform discharges does not

rule out the diagnosis of epilepsy; therefore clinical correlation is recommended.





MMCECILIOL / COLTONN: [id number] / Job#: [id number]

## 2017-12-20 NOTE — P.PN
Subjective





Patient resting in bed  complains of left eye drainage and irritation.  

Continues with right sciatica leg pain





Objective





- Vital Signs


Vital signs: 


 Vital Signs











Temp  97.0 F L  12/20/17 07:00


 


Pulse  63   12/20/17 07:00


 


Resp  16   12/20/17 07:00


 


BP  108/56   12/20/17 07:00


 


Pulse Ox  94 L  12/20/17 07:00








 Intake & Output











 12/19/17 12/20/17 12/20/17





 18:59 06:59 18:59


 


Intake Total  100 


 


Balance  100 


 


Weight 77.111 kg  


 


Intake:   


 


  Oral  100 


 


Other:   


 


  # Voids 1 1 


 


  # Bowel Movements  2 














- Constitutional


General appearance: Present: mild distress





- EENT


EENT Comment(s): 





Right eye conjunctiva reddened


Eyes: Present: PERRLA


Ears: bilateral: normal





- Neck


Neck: Present: normal ROM





- Respiratory


Respiratory: bilateral: CTA





- Cardiovascular


Rhythm: regular





- Gastrointestinal


General gastrointestinal: Present: soft





- Integumentary


Integumentary: Present: normal





- Neurologic


Neurologic: Present: CNII-XII intact





- Psychiatric


Psychiatric: Present: A&O x's 3, appropriate affect, intact judgment & insight





- Labs


CBC & Chem 7: 


 12/19/17 07:45





 12/19/17 07:45


Labs: 


 Abnormal Lab Results - Last 24 Hours (Table)











  12/19/17 Range/Units





  12:16 


 


POC Glucose (mg/dL)  134 H  (75-99)  mg/dL














- Imaging and Cardiology


Chest x-ray: report reviewed


CT Scan - head: report reviewed





Assessment and Plan


Assessment: 





Assessment


Syncope


Narcolepsy


Left eye conjunctivitis


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


Gastric bypass history


History of asthma


Hypertension


Hepatitis C





Plan


Continue consultation with neurology hopeful discharge soon

## 2017-12-20 NOTE — P.PN
Subjective


Progress Note Date: 12/20/17


Principal diagnosis: 





narcolepsy





Neurology is following on a 62-year old male that presented to the ED with 

complaints of sudden onset of sleeping. Occurrences have happened 5x in the 

past 7 days. He denies history of insomnia, lightheadedness, palpitations or 

other acute neurological symptoms. Patient presented at request of family as a 

safety concern. 





CT of brain was unremarkable. CT of cervical spine noted previously known 

cervical changes that were already being treated by our office but no new 

etiology to account for patient symptoms. Lab work was positive for decreased 

HGB, elevated BUN, and significant hepatic insufficiency on AST and ALT. 

Patient denies any ETOH use. 





Interval update:


Patient was resting in bed comfortably, AOx4, no acute distress on contact 

today. He states that his condition is relatively unchanged but he has not had 

any new occurrences to his knowledge in the last 24 hours. 





Objective





- Vital Signs


Vital signs: 


 Vital Signs











Temp  97.8 F   12/20/17 15:00


 


Pulse  70   12/20/17 15:00


 


Resp  16   12/20/17 16:00


 


BP  130/72   12/20/17 15:00


 


Pulse Ox  94 L  12/20/17 15:00








 Intake & Output











 12/19/17 12/20/17 12/20/17





 18:59 06:59 18:59


 


Intake Total  100 


 


Balance  100 


 


Weight 77.111 kg  


 


Intake:   


 


  Oral  100 


 


Other:   


 


  # Voids 1 1 2


 


  # Bowel Movements  2 














- Exam





Constitutional: AOx4, cooperative


HEENT: NC/AT, no facial asymmetry is seen. Throat: Supple, no masses


Respiratory: No increased work of breathing


Cardiac: Regular rate and Rhythm 


GI: non tender, non distended


Musculoskeletal:  strengths are equal bilaterally 5/5, Lower extremity 

strengths are equal bilaterally at 5/5. 


Neurological: CN II-XII in tact, patient was AOx3, speech and language are 

normal, no unilateralizing weakness, no seizure activity note on physical exam. 

Sensation was normal.


Integementary: no rash, no erythema


Psychiatric: mood and affect appropriate








- Labs


CBC & Chem 7: 


 12/19/17 07:45





 12/19/17 07:45


Labs: 


 Abnormal Lab Results - Last 24 Hours (Table)











  12/20/17 12/20/17 Range/Units





  16:57 17:14 


 


POC Glucose (mg/dL)  62 L  107 H  (75-99)  mg/dL














Assessment and Plan


(1) Narcolepsy


Current Visit: Yes   Status: Acute   Code(s): G47.419 - NARCOLEPSY WITHOUT 

CATAPLEXY   SNOMED Code(s): 33860222


   


Plan: 





Narcolepsy





Patient does appear to have symptoms consistent with narcolepsy. Patient's can 

be followed up in the out patient setting for a sleep study with multiple sleep 

latency testing. Continue workup by hospitalist with regard to liver enzymes, 

bradycardia. 





From a neurological standpoint, the patient can be cleared for discharge.





Please advise the patient to follow up with our office within 10 business days. 





If you have any further questions, contact our office.

## 2017-12-20 NOTE — ECHOF
Referral Reason:syncope



MEASUREMENTS

--------

HEIGHT: 165.1 cm

WEIGHT: 77.1 kg

BP: 

IVSd:   1.1 cm     (0.6 - 1.1)

LVIDd:   5.4 cm     (3.9 - 5.3)

LVPWd:   1.1 cm     (0.6 - 1.1)

IVSs:   1.3 cm

LVIDs:   4.6 cm

LVPWs:   1.5 cm

Ao Diam:   4.0 cm     (2.0 - 3.7)

AV Cusp:   2.0 cm     (1.5 - 2.6)

RAP:   5.00 mmHg

RVSP:   12.90 mmHg







FINDINGS

--------

Sinus rhythm.

This was a technically difficult study with suboptimal views.   Unable to visualize any parasternal o
r apical views. Test performed from subcostal window only.

The left ventricular size is normal.   There is mild concentric left ventricular hypertrophy.   Overa
ll left ventricular systolic function is normal with, an EF between 55 - 60 %.

The right ventricle is normal in size and function.

The left atrial size is normal.

RA appears enlarged.

Aortic valve is trileaflet and is mildly thickened.   There is no evidence of aortic regurgitation.  
 There is no evidence of aortic stenosis.

The mitral valve leaflets are mildly thickened.   There is trace mitral regurgitation.

Trace tricuspid regurgitation present.   Right ventricular systolic pressure is normal at < 35 mmHg. 
  There is no evidence of pulmonary hypertension.

The pulmonic valve is normal.

The aortic root size is normal.

Normal inferior vena cava with normal inspiratory collapse consistent with estimated right atrial pre
ssure of  5 mmHg.

The pericardium is normal.   There is no pericardial effusion.



CONCLUSIONS

--------

1. Sinus rhythm.

2. This was a technically difficult study with suboptimal views.

3. Unable to visualize any parasternal or apical views. Test performed from subcostal window only.

4. The left ventricular size is normal.

5. There is mild concentric left ventricular hypertrophy.

6. Overall left ventricular systolic function is normal with, an EF between 55 - 60 %.

7. RA appears enlarged.

8. Aortic valve is trileaflet and is mildly thickened.

9. The mitral valve leaflets are mildly thickened.

10. There is trace mitral regurgitation.

11. Trace tricuspid regurgitation present.

12. Right ventricular systolic pressure is normal at < 35 mmHg.

13. There is no evidence of pulmonary hypertension.

14. The aortic root size is normal.

15. There is no pericardial effusion.





SONOGRAPHER: Chris Fischer RDCS

## 2017-12-20 NOTE — P.DS
Providers


Date of admission: 


12/19/17 11:05





Expected date of discharge: 12/20/17


Attending physician: 


Kwan Montes





Consults: 





 





12/19/17 12:10


Consult Physician Urgent 


   Consulting Provider: Niki Soria


   Consult Reason/Comments: syncope, possible narcolepsy


   Do you want consulting provider notified?: Yes











Primary care physician: 


Kwan Montes





Hospital Course: 





62-year-old male was admitted to the emergency room with complaints of syncopal 

episode. States also that he is been falling asleep during the day. Patient was 

evaluated by a neurologist was diagnosed with narcolepsy. Patient was found to 

have conjunctivitis of the left. Patient is to follow up with a neurologist and 

family physician next week








Assessment


syncope


narcolepsy


history of asthma stable


hypertension


hepatitis C


history of gastric bypass


chronic low back pain with right sciatica





Plan


follow up with a neurologist and family physician


Patient Condition at Discharge: Stable





Plan - Discharge Summary


Discharge Rx Participant: No


New Discharge Prescriptions: 


New


   Tobramycin 0.3% Ophth Soln [Tobrex 0.3% Ophth Soln] 2 drops LEFT EYE Q4HR #1 

bottle





Continue


   Diazepam 10 mg PO BID PRN


     PRN Reason: Anxiety


   Atenolol 50 mg PO DAILY


   oxyCODONE HCL 30 mg PO 5XD


Discharge Medication List





Atenolol 50 mg PO DAILY 01/02/17 [History]


Diazepam 10 mg PO BID PRN 01/02/17 [History]


oxyCODONE HCL 30 mg PO 5XD 01/02/17 [History]


Tobramycin 0.3% Ophth Soln [Tobrex 0.3% Ophth Soln] 2 drops LEFT EYE Q4HR #1 

bottle 12/20/17 [Rx]








Follow up Appointment(s)/Referral(s): 


Kwan Montes MD [Primary Care Provider] - 1-2 days


Dawn Campbell MD [STAFF PHYSICIAN] - 1 Week ([facility], for symptoms of 

Narcolepsy.)

## 2017-12-21 ENCOUNTER — HOSPITAL ENCOUNTER (EMERGENCY)
Dept: HOSPITAL 47 - EC | Age: 62
Discharge: HOME | End: 2017-12-21
Payer: MEDICARE

## 2017-12-21 VITALS — RESPIRATION RATE: 20 BRPM | DIASTOLIC BLOOD PRESSURE: 56 MMHG | SYSTOLIC BLOOD PRESSURE: 138 MMHG | HEART RATE: 81 BPM

## 2017-12-21 VITALS — TEMPERATURE: 97.9 F

## 2017-12-21 DIAGNOSIS — S01.111A: ICD-10-CM

## 2017-12-21 DIAGNOSIS — Z79.899: ICD-10-CM

## 2017-12-21 DIAGNOSIS — J98.11: ICD-10-CM

## 2017-12-21 DIAGNOSIS — G47.419: Primary | ICD-10-CM

## 2017-12-21 DIAGNOSIS — G89.29: ICD-10-CM

## 2017-12-21 DIAGNOSIS — M54.9: ICD-10-CM

## 2017-12-21 DIAGNOSIS — M12.812: ICD-10-CM

## 2017-12-21 DIAGNOSIS — Z79.891: ICD-10-CM
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
## 2017-12-21 DIAGNOSIS — M12.811: ICD-10-CM
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
## 2017-12-21 DIAGNOSIS — Z98.890: ICD-10-CM

## 2017-12-21 DIAGNOSIS — Z87.891: ICD-10-CM

## 2017-12-21 DIAGNOSIS — Z87.39: ICD-10-CM

## 2017-12-21 DIAGNOSIS — I10: ICD-10-CM

## 2017-12-21 PROCEDURE — 99284 EMERGENCY DEPT VISIT MOD MDM: CPT

## 2017-12-21 PROCEDURE — 70450 CT HEAD/BRAIN W/O DYE: CPT

## 2017-12-21 PROCEDURE — 71020: CPT

## 2017-12-21 NOTE — CT
EXAMINATION TYPE: CT brain wo con

 

DATE OF EXAM: 12/21/2017

 

HISTORY: Fall. Laceration above right eye.

 

CT DLP: 1121 mGycm.  Automated Exposure Control for Dose Reduction was Utilized.

 

TECHNIQUE: CT scan of the head is performed without contrast.

 

COMPARISON: CT brain from 2 days ago

 

FINDINGS:   There is no acute intracranial hemorrhage or midline shift identified. There is diffuse v
entricular and sulcal prominence consistent with mild to minimal diffuse age-related cerebral atrophy
. Gray-white matter differentiation is preserved. There is mild to moderate mucosal thickening in rig
ht maxillary sinus with dependent air-fluid level. Remainder paranasal sinuses are clear. Globes are 
intact bilaterally. Small soft tissue hematoma preseptal region superolateral right globe is seen jyothi
r axial image 13. Intraconal fat is preserved. The calvarium is intact.

 

IMPRESSION:  No acute intracranial hemorrhage or midline shift.  There is small right periorbital hem
atoma. Possible acute on chronic right maxillary sinus disease. Correlate clinically.

## 2017-12-21 NOTE — XR
EXAMINATION TYPE: XR chest 2V

 

DATE OF EXAM: 12/21/2017

 

COMPARISON: 12/19/2017

 

TECHNIQUE: PA and lateral views submitted.

 

HISTORY: Pain

 

FINDINGS:

The lungs are clear and  there is no pneumothorax or pleural effusion pneumonia.  Subsegmental consol
idation at the right lung base is stable. No overt failure. Arthropathy of the shoulders and postoper
ative change right shoulder. No overt failure. There is prominence of the ascending aorta.

 

IMPRESSION: 

1. Right basilar atelectasis favored over infiltrate correlate clinically.

2. Prominence of the ascending aortic be associated with ascending aortic aneurysm correlate with CT 
scan.

## 2017-12-21 NOTE — ED
General Adult HPI





- General


Chief complaint: Fall


Stated complaint: Fall


Time Seen by Provider: 12/21/17 10:35


Source: patient, RN notes reviewed, old records reviewed


Mode of arrival: ambulatory


Limitations: no limitations





- History of Present Illness


Initial comments: 





Chief complaint and history of present illness this is a 62-year-old male here 

with a complaint of while walking his dog he felt dizzy and fell down.  No loss 

of consciousness.  Patient does present with a 1 mL laceration to the right 

eyebrow area.  No reported seizure activity.  Patient reports she remained 

alert the entire time.  Patient was recently in hospital with diagnosis made of 

narcolepsy.  Patient is alert and oriented.





- Related Data


 Home Medications











 Medication  Instructions  Recorded  Confirmed


 


Atenolol 50 mg PO DAILY 01/02/17 12/21/17


 


Diazepam 10 mg PO BID PRN 01/02/17 12/21/17


 


oxyCODONE HCL 30 mg PO 5XD 01/02/17 12/21/17








 Previous Rx's











 Medication  Instructions  Recorded


 


Tobramycin 0.3% Ophth Soln [Tobrex 2 drops LEFT EYE Q4HR #1 bottle 12/20/17





0.3% Ophth Soln]  











 Allergies











Allergy/AdvReac Type Severity Reaction Status Date / Time


 


No Known Allergies Allergy   Verified 12/21/17 11:00














Review of Systems


ROS Statement: 


Those systems with pertinent positive or pertinent negative responses have been 

documented in the HPI.


review of systems.  Patient denies any headache no visual acuity changes.  

Denies neck pain denies chest pain.





  States he has chronic low back discomfort.  No GI/ problems currently no 

neuro deficits.  All systems were reviewed.Past medical problems significant 

for recent diagnosis of narcolepsy.  Patient denies high blood pressure 

diabetes cancer stroke or asthma.  Asians surgeries include bariatric surgery, 

heart catheterization without stents, rotator cuff repair and back surgery.  

The patient quit smoking years ago denies alcohol use.  No known ALLERGIES.  

Family history sister had bone cancer.


ROS Other: All systems not noted in ROS Statement are negative.





Past Medical History


Past Medical History: Asthma, GERD/Reflux, Hypertension, Liver Disease


Additional Past Medical History / Comment(s): Pt states currently being worked 

up for possible narcolepsy, hepatitis C, back pain/bulging discs/numbness down 

R leg at tiems, gastric ulcer.


History of Any Multi-Drug Resistant Organisms: None Reported


Past Surgical History: Back Surgery, Bariatric Surgery, Heart Catheterization, 

Orthopedic Surgery


Additional Past Surgical History / Comment(s): gastric bypass, R rotator cuff 

repair, R knee arthroscopy, R carpal tunnel release, cardiac cath-normal.


Past Anesthesia/Blood Transfusion Reactions: No Reported Reaction


Past Psychological History: Anxiety


Smoking Status: Former smoker


Past Alcohol Use History: None Reported


Past Drug Use History: None Reported





- Past Family History


  ** Father


Family Medical History: Hypertension


Additional Family Medical History / Comment(s): ETOH abuse





  ** Mother


Family Medical History: Dementia





General Exam





- General Exam Comments


Initial Comments: 





General:


The patient is awake and alert, in no distress, and does not appear acutely 

ill. chief complaint is patient became dizzy while walking his dog and fell.  

Vital signs temperature 97.9 pulse 73 respiratory rate 16 pulse ox 94% room air 

blood pressure 141/78


Eye:


Pupils are equal, round and reactive to light, extra-ocular movements are intact

; patient states he's been treated for left sided pinkeye.patient has a 1 mL 

laceration to the right eyebrow area.


Ears, nose, mouth and throat:


There are moist mucous membranes and no oral lesions. 


Neck:


The neck is supple, there is no tenderness , no neck pain with full flexion and 

extension.


Cardiovascular:


There is a regular rate and rhythm. No murmur, rub or gallop is appreciated.


Respiratory:


Lungs are clear to auscultation, respirations are non-labored, breath sounds 

are equal. No wheezes, stridor, rales, or rhonchi.


Gastrointestinal:


Soft, non-distended, non-tender abdomen without masses or organomegaly noted. 

There is no rebound or guarding present. No CVA tenderness. Bowel sounds are 

unremarkable.


Back:


full range of motion but with a complaint of chronic back pain and nothing new.


Musculoskeletal:


Normal ROM, no tenderness, There is no pedal edema. There is no calf tenderness 

or swelling. Sensation intact. Pulses equal bilaterally 2+. 


Neurological:


CN II-XII intact, There are no obvious motor or sensory deficits. Coordination 

appears grossly intact. Speech is normal.no focal or lateralizing findings


Skin:


Skin is warm and dry and no rashes or lesions are noted. 


Psychiatric:


Cooperative, appropriate mood & affect, normal judgment. 





Limitations: no limitations





Course


 Vital Signs











  12/21/17





  10:28


 


Temperature 97.9 F


 


Pulse Rate 73


 


Respiratory 16





Rate 


 


Blood Pressure 141/78


 


O2 Sat by Pulse 94 L





Oximetry 














Medical Decision Making





- Medical Decision Making





Medical decision making; the patient had a CAT scan of the brain.  It was done 

and reviewed by radiologist's final report includes impression; no acute 

intracranial hemorrhage, or midline shift.  There is slight right periorbital 

hematoma.  Possible acute on chronic right maxillary sinus disease.  Correlate 

clinically.  As read by Dr. maldonado





X-ray of the chest was done and reviewed by radiologist his findings are the 

lungs are clear and there is no pneumothorax or apical effusion pneumonia.  

Subsegmental consolidation of the right lung base is stable.  No overt failure.

  Arthropathy of the shoulders and postoperative changes of the right shoulder.

  No overt failure.  There is prominence of the ascending aorta.  Impression 

right basilar atelectasis favored over infiltrate correlate clinically.  #2 

prominence of the ascending aorta be associated with ascending aortic aneurysm 

correlate with computed tomography scan.  As read by Dr. Stokes








 chest x-ray was comparedto one that was done on 12/07/2015 and they are 

similar.  The patient will be advised to follow-up with his family doctor 

concerning this finding noted today.





Steri-Strips applied to the 1 mL laceration right eyebrow.  Closure was good.  

Pressure was applied.  Patient was told he might develop a shiner below that 

right eye.





Disposition


Clinical Impression: 


 Fall, Narcolepsy





Disposition: HOME SELF-CARE


Condition: Fair


Instructions:  Fall Prevention for Older Adults (ED)


Additional Instructions: 


apply ice to the eye area.  Follow family physician concerning chest x-ray as 

we discussed.


Referrals: 


Kwan Motnes MD [Primary Care Provider] - 1-2 days


Time of Disposition: 11:53

## 2018-01-17 ENCOUNTER — HOSPITAL ENCOUNTER (EMERGENCY)
Dept: HOSPITAL 47 - EC | Age: 63
Discharge: HOME | End: 2018-01-17
Payer: MEDICARE

## 2018-01-17 ENCOUNTER — HOSPITAL ENCOUNTER (OUTPATIENT)
Dept: HOSPITAL 47 - RADCTMAIN | Age: 63
Discharge: HOME | End: 2018-01-17
Payer: MEDICARE

## 2018-01-17 VITALS — RESPIRATION RATE: 18 BRPM

## 2018-01-17 VITALS — DIASTOLIC BLOOD PRESSURE: 84 MMHG | HEART RATE: 82 BPM | SYSTOLIC BLOOD PRESSURE: 130 MMHG | TEMPERATURE: 97.2 F

## 2018-01-17 DIAGNOSIS — K44.9: Primary | ICD-10-CM

## 2018-01-17 DIAGNOSIS — F41.9: ICD-10-CM

## 2018-01-17 DIAGNOSIS — M19.041: ICD-10-CM

## 2018-01-17 DIAGNOSIS — J98.11: ICD-10-CM

## 2018-01-17 DIAGNOSIS — Z79.891: ICD-10-CM

## 2018-01-17 DIAGNOSIS — M19.042: Primary | ICD-10-CM

## 2018-01-17 DIAGNOSIS — I10: ICD-10-CM

## 2018-01-17 DIAGNOSIS — I71.2: ICD-10-CM

## 2018-01-17 DIAGNOSIS — Z87.891: ICD-10-CM

## 2018-01-17 DIAGNOSIS — Z79.899: ICD-10-CM

## 2018-01-17 PROCEDURE — 72050 X-RAY EXAM NECK SPINE 4/5VWS: CPT

## 2018-01-17 PROCEDURE — 71275 CT ANGIOGRAPHY CHEST: CPT

## 2018-01-17 PROCEDURE — 73130 X-RAY EXAM OF HAND: CPT

## 2018-01-17 PROCEDURE — 96372 THER/PROPH/DIAG INJ SC/IM: CPT

## 2018-01-17 PROCEDURE — 99283 EMERGENCY DEPT VISIT LOW MDM: CPT

## 2018-01-17 NOTE — XR
EXAMINATION TYPE: XR cervical spine comp

 

DATE OF EXAM: 1/17/2018

 

COMPARISON: NONE

 

HISTORY: Cervical neck pain

 

TECHNIQUE: 5 view cervical spine

 

FINDINGS: C4-5 C5-6 C6-7 and C7-T1 disc space narrowing is present. There is a kyphosis centered at C
5. Large anterior vertebral body spurs are present C4-C6. Minimal posterior vertebral body spurring a
t the endplates appears to be present C4-5 C6-7. Posterior spinal lamellar line is intact. Vertebral 
body heights are preserved. Prevertebral space appears within normal limits. Facet degenerative hines
es noted. There is moderate foraminal stenosis at the right C3-4 foramen and likely present on the ri
ght at C4-5, C5-6, C6-7 and C7-T1. Some foraminal stenosis at C6-7 on the left may be present as well
. Odontoid is limited due to overlying occiput despite multiple attempts.

 

IMPRESSION:

1.  Right-sided foraminal stenosis.

2. Anterior vertebral body spurs and kyphosis.

## 2018-01-17 NOTE — ED
General Adult HPI





- General


Chief complaint: Extremity Problem,Nontraumatic


Stated complaint: Pain in hands


Time Seen by Provider: 01/17/18 10:05


Source: patient, RN notes reviewed


Mode of arrival: ambulatory


Limitations: no limitations





- History of Present Illness


Initial comments: 





63 yo male presents to the ER with cc of bilateral thumb pain.  He states that 

this started last night.  Shooting pain to both sons.  She stepped to bilateral 

elbows.  He denies any falls or injuries with this.  He denies any history of 

this in the past.  He states he tried heat and ice without much improvement.  

He states that he was concerned due to the continued discomfort so he thought 

that he should be seen.  Patient denies any other symptoms. Patient denies any 

recent fever, chills, shortness of breath, chest pain, back pain, abdominal pain

, nausea vomiting, numbness or tingling, dysuria or hematuria, constipation or 

diarrhea, headaches or visual changes, or any other current symptoms.





- Related Data


 Home Medications











 Medication  Instructions  Recorded  Confirmed


 


Atenolol 50 mg PO DAILY 01/02/17 01/17/18


 


Diazepam 10 mg PO BID PRN 01/02/17 01/17/18


 


oxyCODONE HCL 30 mg PO 5XD 01/02/17 01/17/18


 


Amoxicillin/Potassium Clav 1 tab PO Q12HR 01/17/18 01/17/18





[Augmentin 875-125 Tablet]   


 


Dextroamphetamine/Amphetamine 30 mg PO DAILY 01/17/18 01/17/18





[Adderall]   








 Previous Rx's











 Medication  Instructions  Recorded


 


predniSONE 50 mg PO DAILY #5 tab 01/17/18











 Allergies











Allergy/AdvReac Type Severity Reaction Status Date / Time


 


No Known Allergies Allergy   Verified 01/17/18 10:23














Review of Systems


ROS Statement: 


Those systems with pertinent positive or pertinent negative responses have been 

documented in the HPI.





ROS Other: All systems not noted in ROS Statement are negative.





Past Medical History


Past Medical History: Asthma, GERD/Reflux, Hypertension, Liver Disease


Additional Past Medical History / Comment(s): Pt states currently being worked 

up for possible narcolepsy, hepatitis C, back pain/bulging discs/numbness down 

R leg at tiems, gastric ulcer.


History of Any Multi-Drug Resistant Organisms: None Reported


Past Surgical History: Back Surgery, Bariatric Surgery, Heart Catheterization, 

Orthopedic Surgery


Additional Past Surgical History / Comment(s): gastric bypass, R rotator cuff 

repair, R knee arthroscopy, R carpal tunnel release, cardiac cath-normal.


Past Anesthesia/Blood Transfusion Reactions: No Reported Reaction


Past Psychological History: Anxiety


Smoking Status: Former smoker





- Past Family History


  ** Father


Family Medical History: Hypertension


Additional Family Medical History / Comment(s): ETOH abuse





  ** Mother


Family Medical History: Dementia





General Exam


Limitations: no limitations


General appearance: alert, in no apparent distress


ENT exam: Present: normal exam, mucous membranes moist


Neck exam: Present: normal inspection.  Absent: tenderness, meningismus, 

lymphadenopathy


Respiratory exam: Present: normal lung sounds bilaterally.  Absent: respiratory 

distress, wheezes, rales, rhonchi, stridor


Cardiovascular Exam: Present: regular rate, normal rhythm, normal heart sounds.

  Absent: systolic murmur, diastolic murmur, rubs, gallop, clicks


Extremities exam: Present: normal inspection, full ROM, tenderness (At the base 

of bilateral thumbs), normal capillary refill.  Absent: pedal edema, joint 

swelling, calf tenderness


Back exam: Present: normal inspection


Neurological exam: Present: alert, oriented X3


Psychiatric exam: Present: normal affect, normal mood


Skin exam: Present: warm, dry, intact, normal color.  Absent: rash





Course


 Vital Signs











  01/17/18





  09:53


 


Temperature 97.1 F L


 


Pulse Rate 91


 


Respiratory 18





Rate 


 


Blood Pressure 157/91


 


O2 Sat by Pulse 95





Oximetry 














Medical Decision Making





- Medical Decision Making





62-year-old male presents for bilateral thumb pain.  At this time x-rays have 

been reviewed that do show severe osteoarthritis.  At this time suspicion for 

possible gout exam does not show suspicion for this with him on steroids help 

with inflammation.  We did discuss close follow-up with orthopedic we did give 

him their information.  We discussed return parameters all questions.  This 

time the patient will be discharged home.  He is in agreement this plan all 

questions have been answered.





- Radiology Data


Radiology results: report reviewed, image reviewed





Disposition


Clinical Impression: 


 Osteoarthritis of thumbs, bilateral





Disposition: HOME SELF-CARE


Condition: Stable


Instructions:  Osteoarthritis (ED)


Additional Instructions: 


Please use medication as discussed. Please follow up with family doctor if 

symptoms have not improved over the next two days. Please return to the 

emergency room if your symptoms increase or worsen or for any other concerns. 


Prescriptions: 


predniSONE 50 mg PO DAILY #5 tab


Referrals: 


Kwan Montes MD [Primary Care Provider] - 1-2 days


Time of Disposition: 11:23

## 2018-01-17 NOTE — CT
EXAMINATION TYPE: CT angio chest

 

DATE OF EXAM: 1/17/2018 5:23 PM

 

COMPARISON: NONE

 

HISTORY: Ascending Aorta Dilation

 

CT DLP: 583.5 mGycm

Automated exposure control for dose reduction was used.

 

CONTRAST: 

CTA scan of the thorax is performed with IV Contrast, patient injected with 100ml mL of Omnipaque 350
, pulmonary embolism protocol.  There are 3-D post processed images..  

 

FINDINGS:

 

There is a hiatal hernia. The lungs are clear of consolidation. There is no evidence of a pulmonary m
ass. There is no pleural effusion. There is aneurysm of ascending aorta measures 4.1 cm. There is no 
sign of dissection.

 

I see no filling defects in the pulmonary arteries. There is no mediastinal adenopathy. There are no 
hilar masses. There is no pericardial effusion. There are surgical clips at the gastric fundus. There
 is some patchy linear density at the right lung base. The bony thorax appears intact.

 

 

IMPRESSION: 

HIATAL HERNIA. PREVIOUS GASTRIC SURGERY. MILD ATELECTASIS AT THE RIGHT LUNG BASE. NO EVIDENCE OF PULM
ONARY EMBOLISM.

 

4.1 CM ANEURYSM OF ASCENDING AORTA.

## 2018-01-17 NOTE — XR
EXAMINATION TYPE: XR hand complete bilateral

 

DATE OF EXAM: 1/17/2018

 

CLINICAL HISTORY: Bilateral thumb pain with no known injury.

 

TECHNIQUE:  Frontal, lateral and oblique images of the bilateral hands were obtained.

 

COMPARISON: None.

 

FINDINGS:  There is no acute fracture/dislocation evident in the either hand. Osseous mineralization 
is within normal limits.

 

Left: Osseous cortical erosions, subchondral sclerosis and osseous irregularity are seen at the left 
first metacarpal phalangeal joint with radial subluxation and without abby dislocation. Blunted ulna
r styloid appears similar to the exam of 2012 and may be congenital rather than styloid erosion. No o
ther significant degenerative changes are seen of the left hand. Punctate foreign bodies measuring 2 
mm each are seen within the soft tissues overlying the tuft of the distal phalanx of both the first a
nd fourth digits on the left. The fourth is located radially and volarly and the first is located uln
ar and volar. No focal soft tissue swelling.

 

Right: As on the left there are degenerative changes of the first metacarpal phalangeal joint, howeve
r no cortical erosions are seen. There is joint space narrowing, marginal osteophytes, and subchondra
l sclerosis/joint space narrowing. Similar mild changes are seen at the distal interphalangeal joint 
of the first digit and fifth digit. No focal soft tissue swelling.

 

IMPRESSION:

1. Extensive degenerative changes of the first metacarpal phalangeal joint suggesting erosive osteoar
thritis with differential diagnosis of gout and CPPD. Rheumatoid arthritis is unlikely given the lack
 of other findings.

2. Mild to moderate degenerative changes of the right first metacarpal phalangeal joint, distal first
 interphalangeal joint and distal fifth interphalangeal joint with the distribution suggesting osteoa
rthritis.

## 2018-03-29 ENCOUNTER — HOSPITAL ENCOUNTER (OUTPATIENT)
Dept: HOSPITAL 47 - LABWHC1 | Age: 63
Discharge: HOME | End: 2018-03-29
Payer: MEDICARE

## 2018-03-29 DIAGNOSIS — B18.2: Primary | ICD-10-CM

## 2018-03-29 LAB
ALBUMIN SERPL-MCNC: 3.9 G/DL (ref 3.5–5)
ALP SERPL-CCNC: 91 U/L (ref 38–126)
ALT SERPL-CCNC: 48 U/L (ref 21–72)
AST SERPL-CCNC: 43 U/L (ref 17–59)
BILIRUB INDIRECT SERPL-MCNC: 0 MG/DL (ref 0–1.1)
BILIRUBIN DIRECT+TOT PNL SERPL-MCNC: 0.3 MG/DL (ref 0–0.2)
PROT SERPL-MCNC: 6.7 G/DL (ref 6.3–8.2)

## 2018-03-29 PROCEDURE — 87522 HEPATITIS C REVRS TRNSCRPJ: CPT

## 2018-03-29 PROCEDURE — 80076 HEPATIC FUNCTION PANEL: CPT

## 2018-03-29 PROCEDURE — 36415 COLL VENOUS BLD VENIPUNCTURE: CPT

## 2018-03-30 LAB
HCV RNA SPEC NAA+PROBE-ACNC: DETECTED IU/ML
HCV RNA SPEC NAA+PROBE-LOG#: 3.3 {LOG_COPIES}/ML (ref ?–1.08)

## 2018-04-08 ENCOUNTER — HOSPITAL ENCOUNTER (EMERGENCY)
Dept: HOSPITAL 47 - EC | Age: 63
Discharge: HOME | End: 2018-04-08
Payer: MEDICARE

## 2018-04-08 VITALS
TEMPERATURE: 98 F | SYSTOLIC BLOOD PRESSURE: 166 MMHG | RESPIRATION RATE: 20 BRPM | HEART RATE: 77 BPM | DIASTOLIC BLOOD PRESSURE: 84 MMHG
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
## 2018-04-08 DIAGNOSIS — G89.29: ICD-10-CM

## 2018-04-08 DIAGNOSIS — S80.01XA: Primary | ICD-10-CM
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
## 2018-04-08 DIAGNOSIS — Z87.891: ICD-10-CM

## 2018-04-08 DIAGNOSIS — Z87.39: ICD-10-CM

## 2018-04-08 DIAGNOSIS — Z79.891: ICD-10-CM

## 2018-04-08 DIAGNOSIS — M79.604: ICD-10-CM

## 2018-04-08 DIAGNOSIS — Z86.19: ICD-10-CM

## 2018-04-08 DIAGNOSIS — M54.5: ICD-10-CM

## 2018-04-08 DIAGNOSIS — I10: ICD-10-CM

## 2018-04-08 DIAGNOSIS — Z98.890: ICD-10-CM

## 2018-04-08 PROCEDURE — 72100 X-RAY EXAM L-S SPINE 2/3 VWS: CPT

## 2018-04-08 PROCEDURE — 99283 EMERGENCY DEPT VISIT LOW MDM: CPT

## 2018-04-08 NOTE — XR
EXAMINATION TYPE: XR lumbar spine 2 or 3V , 3 VIEWS

 

DATE OF EXAM ORDERED: 4/8/2018

 

HISTORY: Pain.

 

COMPARISON: None.

 

FINDINGS:  There is a moderate dextroscoliosis.

 

There is straightening of the normal cervical lumbar lordosis. Alignment remains normal. There is mar
ked disc space loss at L3-4, L4-5 and L5-S1. There is spondylosis deformans present at L3-4. The pedi
cles are intact. No bony destructive lesion is seen.

 

IMPRESSION: 

1. NO ACUTE OSSEOUS LESION.

2. FAIRLY SEVERE DEGENERATIVE CHANGE.

## 2018-04-08 NOTE — XR
EXAMINATION TYPE: XR knee complete RT , 3 VIEWS

 

DATE OF EXAM ORDERED: 4/8/2018

 

HISTORY: Pain.

 

COMPARISON: None.

 

FINDINGS:  There is lateral joint space loss. There is peaking of intercondylar spines. There is ulises
deling change in the medial compartment. No definite joint effusion is seen. No acute osseous lesion 
is seen.

 

IMPRESSION: 

 

OSTEOARTHRITIS.

## 2018-04-08 NOTE — ED
General Adult HPI





- General


Chief complaint: Fall


Stated complaint: Fall


Time Seen by Provider: 04/08/18 09:53


Source: patient, RN notes reviewed


Mode of arrival: ambulatory


Limitations: no limitations





- History of Present Illness


Initial comments: 





Patient 63-year-old male presented to the emergency room today with a chief 

complaint of a trip and fall that occurred yesterday.  He states it was a 

wooden pole sidewalk that he tripped over.  Patient states that he fell down to 

the right side.  He does admit that he does have an abrasion to the lateral 

aspect of the right knee.  He doesn't some pain to the posterior aspect of the 

right knee.  Patient admits to chronic back pain.  He does admit that he feels 

some radiation going down into the right hip area.  Patient states he did take 

his oxycodone at home.  He states it difficult time sleeping last night due to 

the pain.  Patient denies any head injury or loss conscious.  Denies any other 

complaints or symptoms. Patient denies any recent fever, chills, shortness of 

breath, chest pain, abdominal pain, nausea or vomiting, headaches or visual 

changes, or any other complaints.





- Related Data


 Home Medications











 Medication  Instructions  Recorded  Confirmed


 


Atenolol 50 mg PO DAILY 01/02/17 01/17/18


 


Diazepam 10 mg PO BID PRN 01/02/17 01/17/18


 


oxyCODONE HCL 30 mg PO 5XD 01/02/17 01/17/18


 


Amoxicillin/Potassium Clav 1 tab PO Q12HR 01/17/18 01/17/18





[Augmentin 875-125 Tablet]   


 


Dextroamphetamine/Amphetamine 30 mg PO DAILY 01/17/18 01/17/18





[Adderall]   








 Previous Rx's











 Medication  Instructions  Recorded


 


predniSONE 50 mg PO DAILY #5 tab 01/17/18


 


Dexamethasone 0.75 mg PO AS DIRECTED #12 tablet 04/08/18











 Allergies











Allergy/AdvReac Type Severity Reaction Status Date / Time


 


No Known Allergies Allergy   Verified 04/08/18 09:50














Review of Systems


ROS Statement: 


Those systems with pertinent positive or pertinent negative responses have been 

documented in the HPI.





ROS Other: All systems not noted in ROS Statement are negative.





Past Medical History


Past Medical History: Asthma, GERD/Reflux, Hypertension, Liver Disease


Additional Past Medical History / Comment(s): Pt states currently being worked 

up for possible narcolepsy, hepatitis C, back pain/bulging discs/numbness down 

R leg at tiems, gastric ulcer.


History of Any Multi-Drug Resistant Organisms: None Reported


Past Surgical History: Back Surgery, Bariatric Surgery, Heart Catheterization, 

Orthopedic Surgery


Additional Past Surgical History / Comment(s): gastric bypass, R rotator cuff 

repair, R knee arthroscopy, R carpal tunnel release, cardiac cath-normal.


Past Anesthesia/Blood Transfusion Reactions: No Reported Reaction


Past Psychological History: Anxiety


Smoking Status: Former smoker


Past Alcohol Use History: None Reported


Past Drug Use History: None Reported





- Past Family History


  ** Father


Family Medical History: Hypertension


Additional Family Medical History / Comment(s): ETOH abuse





  ** Mother


Family Medical History: Dementia





General Exam





- General Exam Comments


Initial Comments: 





General:  The patient is awake and alert, in no distress, and does not appear 

acutely ill.   


Neck:  The neck is supple, there is no tenderness or JVD.  


Cardiovascular:  There is a regular rate and rhythm. No murmur, rub or gallop 

is appreciated.


Respiratory:  Lungs are clear to auscultation, respirations are non-labored, 

breath sounds are equal.  No wheezes, stridor, rales, or rhonchi.


Musculoskeletal: Patient has normal appearance of the right knee no obvious 

deformity.  Shows good range of motion of the right ankle, right knee, right 

hip.  Patient has no step-off deformity of the thoracic or lumbar spine.  Mild 

tenderness at L3.  Patient sensations are intact.  Pulses are equal bilaterally 

2+.  Strength is 5/5.


Neurological:  A&O x 3. CN II-XII intact, There are no obvious motor or sensory 

deficits. Coordination appears grossly intact. Speech is normal.


Skin:  Skin is warm and dry and no rashes or lesions are noted. 


Psychiatric:  Normal mood and affect.  


Limitations: no limitations





Course


 Vital Signs











  04/08/18





  09:47


 


Temperature 98.0 F


 


Pulse Rate 77


 


Respiratory 20





Rate 


 


Blood Pressure 166/84


 


O2 Sat by Pulse 97





Oximetry 














Medical Decision Making





- Medical Decision Making





Patient reexamined at this time shows no signs of distress is resting 

comfortable.  He does have oxycodone at home that he uses for pain.  He does 

have chronic back pain.  He states that he has chronic symptoms of radiating 

into the right leg at times.  Denies any bowel or bladder incontinence 

retention.  Denies any saddle anesthesia.  Patient x-rays of been reviewed 

showing no acute abnormalities of the lumbar spine and the right knee.  Patient 

will be discharged home will be placed on a steroid to see if it helps with his 

radicular pain.  Advised to follow-up with his family physician which he states 

he does have an appointment coming up in the next 2 days.  Advised return if 

any symptoms increase worsen.





Disposition


Clinical Impression: 


 Fall, Acute exacerbation of chronic low back pain, Contusion of right knee





Disposition: HOME SELF-CARE


Condition: Good


Instructions:  Lumbar Radiculopathy (ED)


Additional Instructions: 


Please use medication as discussed.  Please follow-up with family doctor in the 

next 2 days of symptoms have not improved.  Please return to emergency room if 

the symptoms increase or worsen or for any other concerns.


Prescriptions: 


Dexamethasone 0.75 mg PO AS DIRECTED #12 tablet


Referrals: 


Kwan Montes MD [Primary Care Provider] - 1-2 days


Time of Disposition: 10:40

## 2018-04-15 ENCOUNTER — HOSPITAL ENCOUNTER (EMERGENCY)
Dept: HOSPITAL 47 - EC | Age: 63
Discharge: HOME | End: 2018-04-15
Payer: MEDICARE

## 2018-04-15 VITALS — RESPIRATION RATE: 16 BRPM

## 2018-04-15 VITALS — SYSTOLIC BLOOD PRESSURE: 144 MMHG | TEMPERATURE: 97.9 F | DIASTOLIC BLOOD PRESSURE: 86 MMHG | HEART RATE: 59 BPM
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
## 2018-04-15 DIAGNOSIS — Z79.891: ICD-10-CM

## 2018-04-15 DIAGNOSIS — M54.16: Primary | ICD-10-CM

## 2018-04-15 DIAGNOSIS — Z95.818: ICD-10-CM

## 2018-04-15 DIAGNOSIS — Z87.891: ICD-10-CM

## 2018-04-15 DIAGNOSIS — I10: ICD-10-CM

## 2018-04-15 DIAGNOSIS — Z79.899: ICD-10-CM

## 2018-04-15 DIAGNOSIS — Z98.890: ICD-10-CM

## 2018-04-15 PROCEDURE — 99284 EMERGENCY DEPT VISIT MOD MDM: CPT

## 2018-04-15 NOTE — ED
Lower Extremity Injury HPI





- General


Chief Complaint: Extremity Injury, Lower


Stated Complaint: rt hip ,knee,shin pain


Time Seen by Provider: 04/15/18 09:22


Source: patient, RN notes reviewed


Mode of arrival: ambulatory


Limitations: no limitations





- History of Present Illness


Initial Comments: 





This is a 53-year-old male presents emergency Department with right hip pain.  

Patient states she seen here 1 week ago for a fall.  Patient states she tripped 

over a board fell onto his right hip.  Patient states he had x-rays of the 

time.  He states he has continued pain in his right hip and states occasionally 

radiates down his right leg.  Denies any bowel bladder incontinence or 

retention.  Patient denies any saddle anesthesias.  Patient has chronic back 

pain.  Patient denies any dysuria, hematuria.  Patient states she does take 

pain medications weren't back was not helping.





- Related Data


 Home Medications











 Medication  Instructions  Recorded  Confirmed


 


Atenolol 50 mg PO DAILY 01/02/17 01/17/18


 


Diazepam 10 mg PO BID PRN 01/02/17 01/17/18


 


oxyCODONE HCL 30 mg PO 5XD 01/02/17 01/17/18


 


Amoxicillin/Potassium Clav 1 tab PO Q12HR 01/17/18 01/17/18





[Augmentin 875-125 Tablet]   


 


Dextroamphetamine/Amphetamine 30 mg PO DAILY 01/17/18 01/17/18





[Adderall]   








 Previous Rx's











 Medication  Instructions  Recorded


 


predniSONE 50 mg PO DAILY #5 tab 01/17/18


 


Dexamethasone 0.75 mg PO AS DIRECTED #12 tablet 04/08/18


 


predniSONE 50 mg PO DAILY #5 tab 04/15/18











 Allergies











Allergy/AdvReac Type Severity Reaction Status Date / Time


 


No Known Allergies Allergy   Verified 04/15/18 09:20














Review of Systems


ROS Statement: 


Those systems with pertinent positive or pertinent negative responses have been 

documented in the HPI.





ROS Other: All systems not noted in ROS Statement are negative.





Past Medical History


Past Medical History: Asthma, GERD/Reflux, Hypertension, Liver Disease


Additional Past Medical History / Comment(s): Pt states currently being worked 

up for possible narcolepsy, hepatitis C, back pain/bulging discs/numbness down 

R leg at tiems, gastric ulcer.


History of Any Multi-Drug Resistant Organisms: None Reported


Past Surgical History: Back Surgery, Bariatric Surgery, Heart Catheterization, 

Orthopedic Surgery


Additional Past Surgical History / Comment(s): gastric bypass, R rotator cuff 

repair, R knee arthroscopy, R carpal tunnel release, cardiac cath-normal.


Past Anesthesia/Blood Transfusion Reactions: No Reported Reaction


Past Psychological History: Anxiety


Smoking Status: Former smoker


Past Alcohol Use History: None Reported


Past Drug Use History: None Reported





- Past Family History


  ** Father


Family Medical History: Hypertension


Additional Family Medical History / Comment(s): ETOH abuse





  ** Mother


Family Medical History: Dementia





General Exam


Limitations: no limitations


General appearance: alert, in no apparent distress


Head exam: Present: atraumatic, normocephalic, normal inspection


Respiratory exam: Present: normal lung sounds bilaterally.  Absent: respiratory 

distress, wheezes, rales, rhonchi, stridor


Cardiovascular Exam: Present: regular rate, normal rhythm, normal heart sounds.

  Absent: systolic murmur, diastolic murmur, rubs, gallop, clicks


GI/Abdominal exam: Present: soft, normal bowel sounds.  Absent: distended, 

tenderness, guarding, rebound, rigid


Extremities exam: Present: other (Mild tenderness the right hip no pain with 

log rolling mild pain with traction on the right hip.  Pedal pulses are equal 

bilaterally.  Leg is neurovascularly intact no discoloration or change in color 

or warmth)


Skin exam: Present: warm, dry, intact, normal color.  Absent: rash





Course


 Vital Signs











  04/15/18





  09:18


 


Temperature 97.5 F L


 


Pulse Rate 79


 


Respiratory 16





Rate 


 


Blood Pressure 146/93


 


O2 Sat by Pulse 96





Oximetry 














Medical Decision Making





- Medical Decision Making





63-year-old male presented from for right hip leg pain.  Patient's pain seems 

more consistent with lumbar radiculopathy complaint right hip pain.  Patient 

had prior x-rays of knee and back which were unremarkable.  CT of the hip does 

not show any acute fracture.  There is concerns for bony island versus 

metastasis.  Patient follow-up with orthopedics for bone scan.  Patient patient'

s steroids in addition to his pain meds.  Return parameters were discussed.





Disposition


Clinical Impression: 


 Lumbar radiculopathy, acute, Right hip pain





Disposition: HOME SELF-CARE


Condition: Stable


Instructions:  Lumbar Radiculopathy (ED)


Additional Instructions: 


Please return to the Emergency Department if symptoms worsen or any other 

concerns.


Prescriptions: 


predniSONE 50 mg PO DAILY #5 tab


Referrals: 


wKan Montes MD [Primary Care Provider] - 1-2 days


Bairon Yates MD [STAFF PHYSICIAN] - 1-2 days


Time of Disposition: 09:56

## 2018-04-15 NOTE — CT
EXAMINATION TYPE: CT hip RT wo con

 

DATE OF EXAM: 4/15/2018

 

COMPARISON: NONE

 

HISTORY: Right sided hip pain post fall 1 week ago

 

CT DLP: 303.5 mGycm

Automated exposure control for dose reduction was used.

 

FINDINGS: Image soft tissues are normal.

 

There is pseudocystic change within the humeral head and acetabulum. There is a bone island in the hu
meral head and a second in the neck. No fracture is seen. The femoral head is nonspherical.

 

IMPRESSION: 

1. NO ACUTE FRACTURE.

2. DEGENERATIVE CHANGE.

3. FINDINGS SUGGESTIVE OF FEMOROACETABULAR IMPINGEMENT SYNDROME. PLEASE CORRELATE CLINICALLY.

4. 2 SCLEROTIC LESIONS WITHIN THE RIGHT FEMORAL HEAD AND NECK. ALTHOUGH THESE MAY REPRESENT BONE INDU
NDS. A NUCLEAR MEDICINE WHOLE BODY BONE SCAN TO BE SUGGESTED TO EXCLUDE METASTASES.

## 2018-06-03 ENCOUNTER — HOSPITAL ENCOUNTER (EMERGENCY)
Dept: HOSPITAL 47 - EC | Age: 63
Discharge: HOME | End: 2018-06-03
Payer: MEDICARE

## 2018-06-03 VITALS
HEART RATE: 64 BPM | DIASTOLIC BLOOD PRESSURE: 82 MMHG | TEMPERATURE: 97.8 F | RESPIRATION RATE: 18 BRPM | SYSTOLIC BLOOD PRESSURE: 149 MMHG

## 2018-06-03 DIAGNOSIS — Z87.891: ICD-10-CM

## 2018-06-03 DIAGNOSIS — Z79.891: ICD-10-CM
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
## 2018-06-03 DIAGNOSIS — S61.217A: Primary | ICD-10-CM
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
## 2018-06-03 DIAGNOSIS — I10: ICD-10-CM

## 2018-06-03 DIAGNOSIS — Z79.899: ICD-10-CM

## 2018-06-03 PROCEDURE — 99283 EMERGENCY DEPT VISIT LOW MDM: CPT

## 2018-06-03 PROCEDURE — 12001 RPR S/N/AX/GEN/TRNK 2.5CM/<: CPT

## 2018-06-03 NOTE — XR
EXAMINATION TYPE: XR finger LT

 

DATE OF EXAM: 6/3/2018

 

COMPARISON: NONE

 

HISTORY: Laceration and pain

 

TECHNIQUE: 3 views

 

FINDINGS: There is some narrowing of the DIP joint space. I see no fracture nor dislocation. There ar
e no erosions. There is mild soft tissue swelling at the PIP joint.

 

IMPRESSION: No acute abnormality of the left little finger.

## 2018-06-03 NOTE — ED
Wound/Laceration HPI





- General


Chief Complaint: Wound/Laceration


Stated Complaint: finger lac


Time Seen by Provider: 06/03/18 20:16


Source: patient


Mode of arrival: ambulatory


Limitations: no limitations





- History of Present Illness


Initial Comments: 


63-year-old male presents with laceration to the left fifth digit that occurred 

prior to arrival.  Patient was tried open a bad with a knife and it slipped and 

he punctured his left fifth digit right on the PIP region.  Patient denies any 

loss of sensation or range of motion.  Patient may think he may hit the bone.  

He states his tetanus is up-to-date no other injury.





Extremity Location: Left: Hand


Place: home


Patient Tetanus UTD: Yes


Associated Symptoms: none


Treatments Prior to Arrival: bandage





- Related Data


 Home Medications











 Medication  Instructions  Recorded  Confirmed


 


Atenolol 50 mg PO DAILY 01/02/17 01/17/18


 


Diazepam 10 mg PO BID PRN 01/02/17 01/17/18


 


oxyCODONE HCL 30 mg PO 5XD 01/02/17 01/17/18


 


Amoxicillin/Potassium Clav 1 tab PO Q12HR 01/17/18 01/17/18





[Augmentin 875-125 Tablet]   


 


Dextroamphetamine/Amphetamine 30 mg PO DAILY 01/17/18 01/17/18





[Adderall]   








 Previous Rx's











 Medication  Instructions  Recorded


 


predniSONE 50 mg PO DAILY #5 tab 01/17/18


 


Dexamethasone 0.75 mg PO AS DIRECTED #12 tablet 04/08/18


 


predniSONE 50 mg PO DAILY #5 tab 04/15/18











 Allergies











Allergy/AdvReac Type Severity Reaction Status Date / Time


 


No Known Allergies Allergy   Verified 06/03/18 20:30














Review of Systems


ROS Statement: 


Those systems with pertinent positive or pertinent negative responses have been 

documented in the HPI.





ROS Other: All systems not noted in ROS Statement are negative.


Skin: Reports: other (laceration to left 5th digit)


Neurological: Denies: weakness, numbness, paresthesias





Past Medical History


Past Medical History: Asthma, GERD/Reflux, Hypertension, Liver Disease


Additional Past Medical History / Comment(s): Pt states currently being worked 

up for possible narcolepsy, hepatitis C, back pain/bulging discs/numbness down 

R leg at tiems, gastric ulcer.


History of Any Multi-Drug Resistant Organisms: None Reported


Past Surgical History: Back Surgery, Bariatric Surgery, Heart Catheterization, 

Orthopedic Surgery


Additional Past Surgical History / Comment(s): gastric bypass, R rotator cuff 

repair, R knee arthroscopy, R carpal tunnel release, cardiac cath-normal.


Past Anesthesia/Blood Transfusion Reactions: No Reported Reaction


Past Psychological History: Anxiety


Smoking Status: Former smoker


Past Alcohol Use History: None Reported


Past Drug Use History: None Reported





- Past Family History


  ** Father


Family Medical History: Hypertension


Additional Family Medical History / Comment(s): ETOH abuse





  ** Mother


Family Medical History: Dementia





General Exam


Limitations: no limitations


General appearance: alert, in no apparent distress


Neurological exam: Present: alert, oriented X3, CN II-XII intact


Psychiatric exam: Present: normal affect, normal mood


Skin exam: Present: warm, dry, normal color.  Absent: intact (Less than one him 

ear simple laceration to the left fifth digit PIP region full range of motion 

full sensation good capillary refill good radial pulse), rash





Course


 Vital Signs











  06/03/18





  20:22


 


Temperature 97.8 F


 


Pulse Rate 64


 


Respiratory 18





Rate 


 


Blood Pressure 149/82


 


O2 Sat by Pulse 97





Oximetry 














Procedures





- Laceration


  ** Laceration #1


Indication: laceration


Site: hand


Description: linear


Depth: simple, single layer


Anesthetic Used: lidocaine 1%


Pre-repair: wound explored, irrigated extensively, deep structures intact


Size of Sutures: 5-0


Number of Sutures: 3


Technique: simple, interrupted


Patient Tolerated Procedure: well, no complications





Medical Decision Making





- Medical Decision Making


Reviewed x-ray negative for any acute fractures patient aware patient to keep 

area clean dry and covered keep monitored for increased signs of infection 

redness swelling discharge or pain.








Disposition


Clinical Impression: 


 Laceration





Disposition: HOME SELF-CARE


Condition: Good


Instructions:  Care For Your Stitches (ED), Laceration (ED)


Additional Instructions: 


suture removal in 7-10 days


Is patient prescribed a controlled substance at d/c from ED?: No


Referrals: 


Kwan Montes MD [Primary Care Provider] - 1-2 days


Time of Disposition: 21:05

## 2018-06-15 ENCOUNTER — HOSPITAL ENCOUNTER (OUTPATIENT)
Dept: HOSPITAL 47 - RADCTMAIN | Age: 63
Discharge: HOME | End: 2018-06-15
Attending: FAMILY MEDICINE
Payer: MEDICARE

## 2018-06-15 DIAGNOSIS — I71.2: Primary | ICD-10-CM

## 2018-06-15 PROCEDURE — 71275 CT ANGIOGRAPHY CHEST: CPT

## 2018-06-15 NOTE — CT
EXAMINATION TYPE: CT angio chest

 

DATE OF EXAM: 6/15/2018

 

COMPARISON: Prior CT angiogram 1/17/2018

 

HISTORY: Thoracic Aortic aneurysm without rupture

 

CT DLP: 834 mGycm

Automated exposure control for dose reduction was used.

 

CONTRAST: 

CTA scan of the thorax is performed without and with IV Contrast, patient injected with 100 ml mL of 
Isovue 370, pulmonary embolism protocol.  MIP images are created and reviewed.  3D reconstructed imag
es are created on an independent workstation and reviewed.

 

FINDINGS:

 

LUNGS: The lungs are grossly clear, there is no concerning parenchymal mass or nodule identified.   T
here is no pleural effusion or pneumothorax seen.  The tracheobronchial tree is patent.

 

AORTA: The level of the aortic root the aorta measures approximately 2.7 cm, descending aorta approxi
mately 4.1 cm, proximal descending aorta 2.6 cm, distally the aorta measures approximately 2.5 cm at 
the hiatus. The super aortic branch vessels are patent. MEDIASTINUM: There is satisfactory enhancemen
t of the pulmonary artery and its branches, there is no CT evidence for pulmonary embolism.  There ar
e no greater than 1 cm hilar or mediastinal lymph nodes.   No pericardial effusion is seen. There is 
no luminal filling defect to suggest dissection.

 

 

OTHER:  Pulmonary artery somewhat prominent at 3.2 cm, correlate for possible pulmonary artery hypert
ension. Postop changes are noted at the gastroesophageal junction, distal esophagus appears somewhat 
thickened, postop changes are noted to the stomach. Celiac axis, renal arteries, superior mesenteric 
artery are patent. Bilateral nonobstructive renal calculi are present..

 

 

 

IMPRESSION: 

ESSENTIALLY STABLE FINDINGS WITH ASCENDING AORTIC ANEURYSM.

## 2018-11-07 ENCOUNTER — HOSPITAL ENCOUNTER (OUTPATIENT)
Dept: HOSPITAL 47 - RADXRMAIN | Age: 63
Discharge: HOME | End: 2018-11-07
Attending: FAMILY MEDICINE
Payer: MEDICARE

## 2018-11-07 DIAGNOSIS — M16.11: ICD-10-CM

## 2018-11-07 DIAGNOSIS — M51.36: Primary | ICD-10-CM

## 2018-11-07 PROCEDURE — 73502 X-RAY EXAM HIP UNI 2-3 VIEWS: CPT

## 2018-11-07 PROCEDURE — 72100 X-RAY EXAM L-S SPINE 2/3 VWS: CPT

## 2018-11-07 NOTE — XR
EXAMINATION TYPE: XR Hip Complete RT

 

DATE OF EXAM: 11/7/2018

 

COMPARISON: NONE

 

HISTORY: Pain

 

TECHNIQUE: 2 views submitted

 

FINDINGS:

There is no evidence of erosive change or acute fracture. Moderate to severe concentric narrowing the
 joint space. Sclerotic densities overlying the femur may been the basis of bone island. No erosive c
hanges.

 

 

IMPRESSION:

1. No evidence of acute fracture or dislocation. 

2. Arthropathy of the hip joint follow-up MRI as clinically warranted.

## 2018-11-07 NOTE — XR
EXAM TYPE: LUMBAR SPINE X RAY SERIES

 

COMPARISON: NONE

 

HISTORY: Pain

 

TECHNIQUE: 4 views are submitted.

 

FINDINGS:

Alignment is anatomic.  The pedicles are intact.  The transverse processes are intact.  There is no s
pondylolysis or spondylolisthesis.  Curvature the spine with multilevel hypertrophic and degenerative
 disc disease. Suggestion of fusion of the L3 and L4 vertebral segments and possibly the L2 and L3 se
gments with severe degenerative disc disease noted. Surgical clips in the abdomen noted. Calcificatio
ns the left abdomen could be on the basis of renal calculi.

 

IMPRESSION:

1. Curvature of the spine with multilevel severe degenerative disc disease and hypertrophic changes. 
There may be mild uncovertebral segment fusion secondary to hypertrophic changes. Suspect canal steno
sis and foraminal encroachment at levels L2-S1.

## 2018-12-10 ENCOUNTER — HOSPITAL ENCOUNTER (OUTPATIENT)
Dept: HOSPITAL 47 - RADXRMAIN | Age: 63
Discharge: HOME | End: 2018-12-10
Attending: FAMILY MEDICINE
Payer: MEDICARE

## 2018-12-10 DIAGNOSIS — M23.91: Primary | ICD-10-CM

## 2018-12-10 NOTE — XR
EXAMINATION TYPE: XR knee complete RT

 

DATE OF EXAM: 12/10/2018

 

COMPARISON: 4/8/2018

 

HISTORY: Pain

 

TECHNIQUE:

Four views are submitted.

 

FINDINGS:

There is lateral joint space loss. There is peaking of intercondylar spines. There is remodeling arreola
ge in the medial compartment. No definite joint effusion is seen. No acute osseous lesion is seen. 

 No erosive changes. Arthropathy and hypertrophic change of the patellofemoral joint with suprapatell
ar bursal fluid collection.

 

IMPRESSION:

1. Findings are suggestive of osteoarthritis. Moderate-sized suprapatellar bursal fluid collection. C
orrelate with MRI as clinically warranted.

## 2018-12-11 ENCOUNTER — HOSPITAL ENCOUNTER (OUTPATIENT)
Dept: HOSPITAL 47 - LABWHC1 | Age: 63
Discharge: HOME | End: 2018-12-11
Attending: FAMILY MEDICINE
Payer: MEDICARE

## 2018-12-11 DIAGNOSIS — M48.061: Primary | ICD-10-CM

## 2018-12-11 LAB — BUN SERPL-SCNC: 22 MG/DL (ref 9–27)

## 2018-12-11 PROCEDURE — 36415 COLL VENOUS BLD VENIPUNCTURE: CPT

## 2018-12-11 PROCEDURE — 84520 ASSAY OF UREA NITROGEN: CPT

## 2018-12-11 PROCEDURE — 82565 ASSAY OF CREATININE: CPT

## 2018-12-12 ENCOUNTER — HOSPITAL ENCOUNTER (OUTPATIENT)
Dept: HOSPITAL 47 - RADMRIMAIN | Age: 63
Discharge: HOME | End: 2018-12-12
Attending: FAMILY MEDICINE
Payer: MEDICARE

## 2018-12-12 DIAGNOSIS — M51.36: Primary | ICD-10-CM

## 2018-12-12 DIAGNOSIS — M41.86: ICD-10-CM

## 2018-12-12 DIAGNOSIS — M46.97: ICD-10-CM

## 2018-12-12 PROCEDURE — 72158 MRI LUMBAR SPINE W/O & W/DYE: CPT

## 2018-12-12 NOTE — MR
EXAMINATION TYPE: MR lumbar spine wo/w con

 

DATE OF EXAM: 12/12/2018

 

COMPARISON: Prior lumbar MRI 7/21/2017

 

HISTORY: Spinal stenosis, lumbar region

 

TECHNIQUE: 

Multiplanar, multisequence images of the lumbar spine were acquired utilizing 7.5 mL intravenous Gada
vist gadolinium contrast.    

 

L1-L2: Posterior broad-based disc bulge causes mild anterior mass effect on the thecal sac. Facet art
hropathy causes some posterior lateral mass effect on the thecal sac. No significant central stenosis
 or foraminal encroachment.

 

L2-L3: Posterior extension of endplate disc complex causes only minimal anterior mass effect on the t
hecal sac. Facet arthropathy is present causing some posterior lateral mass effect on the thecal sac.
 Some left-sided foraminal encroachment is likely contributed by scoliosis, lateral extension endplat
e disc complex. No significant central stenosis.

 

L3-L4: Circumferential extension of endplate disc complex results in foraminal encroachment bilateral
ly. Facet arthropathy with hypertrophy ligamentum flavum associated trefoil appearance to the thecal 
sac as on prior exam.

 

L4-L5: Circumferential extension of endplate disc complex causes mild anterior mass effect on the the
cali sac. Hypertrophic changes of the facets causes some minimal lateral mass effect on the thecal sac
. No significant central stenosis. Circumferential extension of endplate disc complex results in high
 degree of foraminal encroachment right greater than left prior exam. Laminectomies present.

 

L5-S1: Stable appearance. Facet arthropathy is present. Endplate extends laterally to cause some righ
t-sided foraminal encroachment as on prior. Laminectomies are present.

 

Lumbar segments are intact.  No paraspinal masses are identified.  Conus medullaris has a normal appe
arance. Lumbar vertebral bodies show stable height, alignment, there is a dextroscoliosis centered at
 the mid lumbar spine. Endplate discogenic marrow signal changes are again noted, there is loss of di
sc height signal especially at L4-5, L3-4 and L2-3 multilevel spondylosis is present, vacuum phenomen
on present at intervertebral levels. Suspect small cortical cyst associated with the right kidney. No
 abnormal enhancement following contrast administration.

 

IMPRESSION:

Stable degenerative disc disease, facet arthropathy, scoliosis.

## 2019-03-20 ENCOUNTER — HOSPITAL ENCOUNTER (OUTPATIENT)
Dept: HOSPITAL 47 - RADCTMAIN | Age: 64
Discharge: HOME | End: 2019-03-20
Attending: SURGERY
Payer: MEDICARE

## 2019-03-20 DIAGNOSIS — I71.2: Primary | ICD-10-CM

## 2019-03-20 LAB — BUN SERPL-SCNC: 19 MG/DL (ref 9–20)

## 2019-03-20 PROCEDURE — 84520 ASSAY OF UREA NITROGEN: CPT

## 2019-03-20 PROCEDURE — 82565 ASSAY OF CREATININE: CPT

## 2019-03-20 PROCEDURE — 71275 CT ANGIOGRAPHY CHEST: CPT

## 2019-03-20 PROCEDURE — 36415 COLL VENOUS BLD VENIPUNCTURE: CPT

## 2019-03-20 NOTE — CT
EXAMINATION TYPE: CT angio chest

 

DATE OF EXAM: 3/20/2019

 

COMPARISON: CTA chest Stacia 15, 2018 and older CTs

 

HISTORY: Thoracic aortic aneurysm, without rupture.

 

CT DLP: 362.6 mGycm. Automated Exposure Control for Dose Reduction was Utilized.

 

 

CONTRAST: 

CTA scan of the thorax is performed with IV Contrast, patient injected with 100ml mL of Isovue 370, p
ulmonary embolism protocol. Three-D reconstructed images are created on independent workstation and r
eviewed.

 

FINDINGS:

 

LUNGS: Increasing linear opacity right lung base favors worsening scarring and/or atelectasis most pr
ominent posteriorly. Remainder of both lungs are predominantly clear. No suspicious nodules or masses
 are identified. No pleural effusion or pneumothorax is seen bilaterally.

 

MEDIASTINUM: Ascending aorta measures up to 3.9 cm in diameter axial image 29 not significant change 
from prior studies.  There are no greater than 1 cm hilar or mediastinal lymph nodes.   No cardiomega
ly or pericardial effusion is seen. Rightward positioning of cardiac apex is unchanged from prior ronni
dies.

 

OTHER: Surgical changes the gastroesophageal junction from Kosta fundoplication surgery with focal d
ilatation and wall thickening of adjacent distal esophagus is redemonstrated. Some areas of cortical 
thinning with small bilateral renal calculi are redemonstrated in both kidneys. There is 1.5 cm simpl
e appearing cyst left hepatic lobe axial image 59 redemonstrated.

 

IMPRESSION: Stable 3.9 cm aneurysm of the ascending aorta. No significant change from prior CTs.

## 2019-04-12 ENCOUNTER — HOSPITAL ENCOUNTER (OUTPATIENT)
Dept: HOSPITAL 47 - RADUSWWP | Age: 64
Discharge: HOME | End: 2019-04-12
Payer: MEDICARE

## 2019-04-12 DIAGNOSIS — B18.2: ICD-10-CM

## 2019-04-12 DIAGNOSIS — K80.50: Primary | ICD-10-CM

## 2019-04-12 LAB
ALBUMIN SERPL-MCNC: 4.3 G/DL (ref 3.5–5)
ALP SERPL-CCNC: 70 U/L (ref 38–126)
ALT SERPL-CCNC: 248 U/L (ref 21–72)
AST SERPL-CCNC: 139 U/L (ref 17–59)
BILIRUB INDIRECT SERPL-MCNC: 0.5 MG/DL (ref 0–1.1)
BILIRUBIN DIRECT+TOT PNL SERPL-MCNC: 0.2 MG/DL (ref 0–0.2)
ERYTHROCYTE [DISTWIDTH] IN BLOOD BY AUTOMATED COUNT: 4.36 M/UL (ref 4.3–5.9)
ERYTHROCYTE [DISTWIDTH] IN BLOOD: 15.9 % (ref 11.5–15.5)
HCT VFR BLD AUTO: 35 % (ref 39–53)
HGB BLD-MCNC: 11 GM/DL (ref 13–17.5)
INR PPP: 1 (ref ?–1.2)
MCH RBC QN AUTO: 25.3 PG (ref 25–35)
MCHC RBC AUTO-ENTMCNC: 31.5 G/DL (ref 31–37)
MCV RBC AUTO: 80.3 FL (ref 80–100)
PLATELET # BLD AUTO: 208 K/UL (ref 150–450)
PROT SERPL-MCNC: 7.3 G/DL (ref 6.3–8.2)
PT BLD: 11 SEC (ref 9–12)
WBC # BLD AUTO: 4.2 K/UL (ref 3.8–10.6)

## 2019-04-12 PROCEDURE — 80076 HEPATIC FUNCTION PANEL: CPT

## 2019-04-12 PROCEDURE — 76705 ECHO EXAM OF ABDOMEN: CPT

## 2019-04-12 PROCEDURE — 87522 HEPATITIS C REVRS TRNSCRPJ: CPT

## 2019-04-12 PROCEDURE — 87902 NFCT AGT GNTYP ALYS HEP C: CPT

## 2019-04-12 PROCEDURE — 85027 COMPLETE CBC AUTOMATED: CPT

## 2019-04-12 PROCEDURE — 85610 PROTHROMBIN TIME: CPT

## 2019-04-12 NOTE — US
EXAMINATION TYPE: US liver

 

DATE OF EXAM: 4/12/2019

 

COMPARISON: NONE

 

CLINICAL HISTORY: B18.2 Chronic viral hep c.

 

EXAM MEASUREMENTS:

 

Liver Length:  16.8 cm   

Gallbladder Wall:  0.2 cm   

CBD:  0.4 cm

Right Kidney:  10.1 x 5.0 x 5.8 cm

 

 

 

Pancreas:  obscured by overlying midline bowel gas

Liver:  dilated portal vein at 1.7cm  

Gallbladder:  wnl

**Evidence for sonographic Yates's sign:  no

CBD:  wnl 

Right Kidney:  lobulated contour, multiple echogenic foci with largest measuring 0.5cm, 1.9 x 1.7 x 1
.6cm hypoechoic area mid pole  

 

 

 

IMPRESSION:

1. Solid lesion mid pole left kidney to exclude. Consider CT correlation.

2. Multiple nonobstructing calculi.

## 2019-04-15 LAB — HCV RNA SERPL NAA+PROBE-LOG IU: 4.85 {LOG_IU}/ML (ref ?–1.08)

## 2019-04-19 ENCOUNTER — HOSPITAL ENCOUNTER (OUTPATIENT)
Dept: HOSPITAL 47 - RADCTMAIN | Age: 64
End: 2019-04-19
Attending: FAMILY MEDICINE
Payer: MEDICARE

## 2019-04-19 DIAGNOSIS — N20.0: Primary | ICD-10-CM

## 2019-04-19 LAB — BUN SERPL-SCNC: 25 MG/DL (ref 9–20)

## 2019-04-19 PROCEDURE — 82565 ASSAY OF CREATININE: CPT

## 2019-04-19 PROCEDURE — 36415 COLL VENOUS BLD VENIPUNCTURE: CPT

## 2019-04-19 PROCEDURE — 74160 CT ABDOMEN W/CONTRAST: CPT

## 2019-04-19 PROCEDURE — 84520 ASSAY OF UREA NITROGEN: CPT

## 2019-04-19 NOTE — CT
EXAMINATION TYPE: CT abdomen w con

 

DATE OF EXAM: 4/19/2019

 

COMPARISON: 6/24/2017 CT, 4/12/2019 ultrasound

 

INDICATION: Abn US

 

DLP: 840.8 mGycm, Automated exposure control for dose reduction was used.

 

CONTRAST:  100 mL of Isovue 300. 

                        Study performed with Oral Contrast

 

TECHNIQUE: Axial images were obtained from above the diaphragm to the pubic rami in the axial plane a
t 5 mm thick sections.  Reconstructed images are reviewed on the computer in the coronal plane.  

 

FINDINGS:

 

Limited CT sections are obtained the lung bases.  The lung bases are clear.  There is thickening of t
he distal esophagus with a patulous appearance. Surgery appears to be at the gastroesophageal junctio
n. Findings appear stable from prior study. Consider follow-up esophagram evaluation.

 

CT ABDOMEN:

 

Liver: There is a 1.3 cm cyst adjacent to the vein within the liver.  On the delayed images additiona
l couple of punctate cyst may be at the superior right lobe liver.

 

Spleen: Calcified splenic granuloma are present.

 

Pancreas: Normal

 

Adrenal glands: The adrenal glands are normal.

 

Gallbladder: Decompressed.  

 

Kidneys: No masses are evident. The left renal abnormality identified by ultrasound is not evident on
 the CT examination. Monitoring with ultrasound could be performed. No hydronephrosis is present.   N
o cysts are present.  There are several nonobstructing renal stones present bilaterally. The largest 
at the inferior pole left kidney measuring 0.5 cm. Delayed images were obtained through the kidneys w
hich appear unremarkable. 

 

Aorta: Vascular calcification is within the aorta. 

 

Inferior vena cava: Normal.

 

Loops of bowel within the abdomen are normal.     There are loops of bowel which are incompletely dis
tended or lack oral contrast limiting their evaluation.

 

IMPRESSIONS:

1.  Small probable hepatic cysts.

2. Multiple bilateral nonobstructing renal stones. The largest at the left inferior pole measuring 0.
5 cm.

3. No discrete renal mass on the left kidney is not identified. Monitoring with ultrasound could be.

## 2019-11-02 ENCOUNTER — HOSPITAL ENCOUNTER (OUTPATIENT)
Dept: HOSPITAL 47 - RADMRIMAIN | Age: 64
Discharge: HOME | End: 2019-11-02
Attending: FAMILY MEDICINE
Payer: MEDICARE

## 2019-11-02 DIAGNOSIS — M94.261: ICD-10-CM

## 2019-11-02 DIAGNOSIS — R93.7: ICD-10-CM

## 2019-11-02 DIAGNOSIS — M17.11: Primary | ICD-10-CM

## 2019-11-02 NOTE — MR
EXAMINATION TYPE: MR knee RT wo con

 

DATE OF EXAM: 11/2/2019 7:45 AM

 

COMPARISON: NONE

 

HISTORY: Rt knee pain

 

TECHNIQUE: Multiplanar, multiecho imaging of the right knee is performed without IV contrast.

 

FINDINGS: There is a small to moderate right knee joint effusion.

 

There is grade IV chondromalacia involving the lateral patellar facet. There is grade I to III chondr
omalacia involving the weightbearing surface of the lateral femoral condyle. There is grade I to II c
hondromalacia involving the weightbearing surface of the medial femoral condyle. There is grade IV ch
ondromalacia involving the left tibial plateau.

 

There is abnormal signal in the posterior horn of the medial meniscus. This communicates with the inf
erior articulating surface. I could not exclude an undisplaced tear.

 

The posterior horn of the lateral meniscus is disrupted. I'm uncertain whether this iatrogenic. The a
nterior horn is also swollen with abnormal signal. There is a complex tear of the body and anterior h
orn.

 

The posterior cruciate ligament is intact. There is myxoid change in the anterior cruciate ligament.

 

There is severe extrusion of the lateral meniscus with bowing of the lateral collateral ligament. The
 medial collateral ligament has a more normal appearance. The iliotibial band insertional is not incr
eased tubercle. The popliteus muscle and tendon appear intact.

 

The quadriceps and biceps tendon are intact. There is mild inflammation in the Hoffa fat space.

 

There are remodeling changes in both the medial and lateral compartments as well as the patellofemora
l joint.

 

IMPRESSION:  

1. CHANGES OF OSTEOARTHRITIS.

2. CHONDROMALACIA AS DESCRIBED.

3. QUESTIONABLE UNDISPLACED TEAR OF THE POSTERIOR HORN OF THE MEDIAL MENISCUS.

4. ABNORMAL POSTERIOR HORN OF THE LATERAL MENISCUS AND ANTERIOR HORN. THERE DOES APPEAR TO BE A COMPL
EX TEAR OF THE BODY AND ANTERIOR HORN.

## 2020-02-11 ENCOUNTER — HOSPITAL ENCOUNTER (EMERGENCY)
Dept: HOSPITAL 47 - EC | Age: 65
Discharge: HOME | End: 2020-02-11
Payer: MEDICARE

## 2020-02-11 VITALS — RESPIRATION RATE: 18 BRPM

## 2020-02-11 VITALS — DIASTOLIC BLOOD PRESSURE: 98 MMHG | HEART RATE: 66 BPM | SYSTOLIC BLOOD PRESSURE: 177 MMHG

## 2020-02-11 VITALS — TEMPERATURE: 97.8 F

## 2020-02-11 DIAGNOSIS — Z87.891: ICD-10-CM

## 2020-02-11 DIAGNOSIS — Z95.818: ICD-10-CM

## 2020-02-11 DIAGNOSIS — Z79.891: ICD-10-CM

## 2020-02-11 DIAGNOSIS — I10: Primary | ICD-10-CM

## 2020-02-11 DIAGNOSIS — Z79.899: ICD-10-CM

## 2020-02-11 LAB
ALBUMIN SERPL-MCNC: 4.1 G/DL (ref 3.5–5)
ALP SERPL-CCNC: 78 U/L (ref 38–126)
ALT SERPL-CCNC: 16 U/L (ref 4–49)
ANION GAP SERPL CALC-SCNC: 7 MMOL/L
AST SERPL-CCNC: 33 U/L (ref 17–59)
BASOPHILS # BLD AUTO: 0.1 K/UL (ref 0–0.2)
BASOPHILS NFR BLD AUTO: 1 %
BUN SERPL-SCNC: 12 MG/DL (ref 9–20)
CALCIUM SPEC-MCNC: 9 MG/DL (ref 8.4–10.2)
CHLORIDE SERPL-SCNC: 100 MMOL/L (ref 98–107)
CO2 SERPL-SCNC: 31 MMOL/L (ref 22–30)
EOSINOPHIL # BLD AUTO: 0.3 K/UL (ref 0–0.7)
EOSINOPHIL NFR BLD AUTO: 5 %
ERYTHROCYTE [DISTWIDTH] IN BLOOD BY AUTOMATED COUNT: 4.54 M/UL (ref 4.3–5.9)
ERYTHROCYTE [DISTWIDTH] IN BLOOD: 17.1 % (ref 11.5–15.5)
GLUCOSE SERPL-MCNC: 98 MG/DL (ref 74–99)
HCT VFR BLD AUTO: 36.1 % (ref 39–53)
HGB BLD-MCNC: 11.2 GM/DL (ref 13–17.5)
LYMPHOCYTES # SPEC AUTO: 1.2 K/UL (ref 1–4.8)
LYMPHOCYTES NFR SPEC AUTO: 24 %
MCH RBC QN AUTO: 24.7 PG (ref 25–35)
MCHC RBC AUTO-ENTMCNC: 31.1 G/DL (ref 31–37)
MCV RBC AUTO: 79.4 FL (ref 80–100)
MONOCYTES # BLD AUTO: 0.2 K/UL (ref 0–1)
MONOCYTES NFR BLD AUTO: 4 %
NEUTROPHILS # BLD AUTO: 3.2 K/UL (ref 1.3–7.7)
NEUTROPHILS NFR BLD AUTO: 64 %
PLATELET # BLD AUTO: 225 K/UL (ref 150–450)
POTASSIUM SERPL-SCNC: 3.5 MMOL/L (ref 3.5–5.1)
PROT SERPL-MCNC: 7.1 G/DL (ref 6.3–8.2)
SODIUM SERPL-SCNC: 138 MMOL/L (ref 137–145)
WBC # BLD AUTO: 5.1 K/UL (ref 3.8–10.6)

## 2020-02-11 PROCEDURE — 96374 THER/PROPH/DIAG INJ IV PUSH: CPT

## 2020-02-11 PROCEDURE — 85025 COMPLETE CBC W/AUTO DIFF WBC: CPT

## 2020-02-11 PROCEDURE — 96375 TX/PRO/DX INJ NEW DRUG ADDON: CPT

## 2020-02-11 PROCEDURE — 99283 EMERGENCY DEPT VISIT LOW MDM: CPT

## 2020-02-11 PROCEDURE — 80053 COMPREHEN METABOLIC PANEL: CPT

## 2020-02-11 PROCEDURE — 36415 COLL VENOUS BLD VENIPUNCTURE: CPT

## 2020-02-11 NOTE — ED
General Adult HPI





- General


Chief complaint: Recheck/Abnormal Lab/Rx


Stated complaint: High blood pressure


Time Seen by Provider: 02/11/20 19:57


Source: patient, RN notes reviewed


Mode of arrival: ambulatory


Limitations: no limitations





- History of Present Illness


Initial comments: 





This is a 64-year-old male presents emergency Department chief complaint of 

hypertension.  Patient states his blood pressure has been steadily climbing over

the last couple weeks.  Patient was seen at PCPs office yesterday and was found 

to have hypertension 200/100.  Patient was advised to increase his lisinopril.  

Patient states that he did take 2 doses today and it was still elevated.  He has

no other complaints denies chest pain, shortness breath, headache, dizziness, 

nausea, vomiting, diarrhea constipation.





- Related Data


                                Home Medications











 Medication  Instructions  Recorded  Confirmed


 


Atenolol 50 mg PO DAILY 01/02/17 01/17/18


 


Diazepam 10 mg PO BID PRN 01/02/17 01/17/18


 


oxyCODONE HCL [oxyCODONE HCL (IR)] 30 mg PO 5XD 01/02/17 01/17/18


 


Amoxicillin/Potassium Clav 1 tab PO Q12HR 01/17/18 01/17/18





[Augmentin 875-125 Tablet]   


 


Dextroamphetamine/Amphetamine 30 mg PO DAILY 01/17/18 01/17/18





[Adderall]   








                                  Previous Rx's











 Medication  Instructions  Recorded


 


predniSONE 50 mg PO DAILY #5 tab 01/17/18


 


Dexamethasone 0.75 mg PO AS DIRECTED #12 tablet 04/08/18


 


predniSONE 50 mg PO DAILY #5 tab 04/15/18











                                    Allergies











Allergy/AdvReac Type Severity Reaction Status Date / Time


 


No Known Allergies Allergy   Verified 06/03/18 20:30














Review of Systems


ROS Statement: 


Those systems with pertinent positive or pertinent negative responses have been 

documented in the HPI.





ROS Other: All systems not noted in ROS Statement are negative.





Past Medical History


Past Medical History: Asthma, GERD/Reflux, Hypertension, Liver Disease


Additional Past Medical History / Comment(s): Pt states currently being worked 

up for possible narcolepsy, hepatitis C, back pain/bulging discs/numbness down R

 leg at tiems, gastric ulcer.


History of Any Multi-Drug Resistant Organisms: None Reported


Past Surgical History: Back Surgery, Bariatric Surgery, Heart Catheterization, 

Orthopedic Surgery


Additional Past Surgical History / Comment(s): gastric bypass, R rotator cuff 

repair, R knee arthroscopy, R carpal tunnel release, cardiac cath-normal.


Past Anesthesia/Blood Transfusion Reactions: No Reported Reaction


Past Psychological History: Anxiety


Smoking Status: Former smoker


Past Alcohol Use History: None Reported


Past Drug Use History: None Reported





- Past Family History


  ** Father


Family Medical History: Hypertension


Additional Family Medical History / Comment(s): ETOH abuse





  ** Mother


Family Medical History: Dementia





General Exam


Limitations: no limitations


General appearance: alert, in no apparent distress


Head exam: Present: atraumatic, normocephalic, normal inspection


ENT exam: Present: normal exam, normal oropharynx, mucous membranes moist


Neck exam: Present: normal inspection, full ROM.  Absent: tenderness, 

meningismus, lymphadenopathy


Respiratory exam: Present: normal lung sounds bilaterally.  Absent: respiratory 

distress, wheezes, rales, rhonchi, stridor


Cardiovascular Exam: Present: regular rate, normal rhythm, normal heart sounds. 

 Absent: systolic murmur, diastolic murmur, rubs, gallop, clicks


Extremities exam: Present: other (Pulses are equal in all extremities.)


Neurological exam: Present: alert, oriented X3, CN II-XII intact, reflexes 

normal.  Absent: motor sensory deficit


Skin exam: Present: warm, dry, intact, normal color.  Absent: rash





Course


                                   Vital Signs











  02/11/20 02/11/20 02/11/20





  18:26 20:32 21:00


 


Temperature 97.8 F  


 


Pulse Rate 66 67 61


 


Respiratory 19 18 18





Rate   


 


Blood Pressure 179/89 193/105 183/104


 


O2 Sat by Pulse 97 98 96





Oximetry   














Medical Decision Making





- Medical Decision Making





64-year-old male presented for hypertension.  This has been increasing last few 

weeks.  Patient's blood pressure yesterday at PCPs office 200/100.  Patient 

needs his blood pressure medication adjusted.  He is advised take 20 mg of 

lisinopril in the morning and to reevaluate his blood pressure.  If persistently

 elevated he has taken additional 10 mg as directed by PCP.  Patient will 

follow-up for recheck and return for any worsening symptoms.  Patient has no 

complaints of chest pain, shortness breath, headache, dizziness, blurred vision,

 nausea vomiting no abdominal pain no back pain.





- Lab Data


Result diagrams: 


                                 02/11/20 20:32





                                 02/11/20 20:32


                                   Lab Results











  02/11/20 02/11/20 Range/Units





  20:32 20:32 


 


WBC  5.1   (3.8-10.6)  k/uL


 


RBC  4.54   (4.30-5.90)  m/uL


 


Hgb  11.2 L   (13.0-17.5)  gm/dL


 


Hct  36.1 L   (39.0-53.0)  %


 


MCV  79.4 L   (80.0-100.0)  fL


 


MCH  24.7 L   (25.0-35.0)  pg


 


MCHC  31.1   (31.0-37.0)  g/dL


 


RDW  17.1 H   (11.5-15.5)  %


 


Plt Count  225   (150-450)  k/uL


 


Neutrophils %  64   %


 


Lymphocytes %  24   %


 


Monocytes %  4   %


 


Eosinophils %  5   %


 


Basophils %  1   %


 


Neutrophils #  3.2   (1.3-7.7)  k/uL


 


Lymphocytes #  1.2   (1.0-4.8)  k/uL


 


Monocytes #  0.2   (0-1.0)  k/uL


 


Eosinophils #  0.3   (0-0.7)  k/uL


 


Basophils #  0.1   (0-0.2)  k/uL


 


Hypochromasia  Slight   


 


Anisocytosis  Slight   


 


Microcytosis  Slight   


 


Sodium   138  (137-145)  mmol/L


 


Potassium   3.5  (3.5-5.1)  mmol/L


 


Chloride   100  ()  mmol/L


 


Carbon Dioxide   31 H  (22-30)  mmol/L


 


Anion Gap   7  mmol/L


 


BUN   12  (9-20)  mg/dL


 


Creatinine   0.92  (0.66-1.25)  mg/dL


 


Est GFR (CKD-EPI)AfAm   >90  (>60 ml/min/1.73 sqM)  


 


Est GFR (CKD-EPI)NonAf   87  (>60 ml/min/1.73 sqM)  


 


Glucose   98  (74-99)  mg/dL


 


Calcium   9.0  (8.4-10.2)  mg/dL


 


Total Bilirubin   0.5  (0.2-1.3)  mg/dL


 


AST   33  (17-59)  U/L


 


ALT   16  (4-49)  U/L


 


Alkaline Phosphatase   78  ()  U/L


 


Total Protein   7.1  (6.3-8.2)  g/dL


 


Albumin   4.1  (3.5-5.0)  g/dL














Disposition


Clinical Impression: 


 Hypertension





Disposition: HOME SELF-CARE


Condition: Stable


Additional Instructions: 


Please return to the Emergency Department if symptoms worsen or any other 

concerns.


Is patient prescribed a controlled substance at d/c from ED?: No


Referrals: 


Kwan Montes MD [Primary Care Provider] - 1-2 days


Time of Disposition: 21:50

## 2020-03-16 ENCOUNTER — HOSPITAL ENCOUNTER (OUTPATIENT)
Age: 65
Discharge: HOME | End: 2020-03-16
Payer: MEDICARE

## 2020-03-16 DIAGNOSIS — I71.2: Primary | ICD-10-CM

## 2020-03-16 PROCEDURE — 82565 ASSAY OF CREATININE: CPT

## 2020-03-16 PROCEDURE — 71275 CT ANGIOGRAPHY CHEST: CPT

## 2020-03-16 PROCEDURE — 36415 COLL VENOUS BLD VENIPUNCTURE: CPT

## 2020-03-16 PROCEDURE — 84520 ASSAY OF UREA NITROGEN: CPT

## 2020-11-16 ENCOUNTER — HOSPITAL ENCOUNTER (OUTPATIENT)
Dept: HOSPITAL 47 - ORWHC2ENDO | Age: 65
Discharge: HOME | End: 2020-11-16
Attending: INTERNAL MEDICINE
Payer: MEDICARE

## 2020-11-16 VITALS — TEMPERATURE: 98 F | RESPIRATION RATE: 16 BRPM

## 2020-11-16 VITALS — DIASTOLIC BLOOD PRESSURE: 71 MMHG | SYSTOLIC BLOOD PRESSURE: 111 MMHG | HEART RATE: 62 BPM

## 2020-11-16 DIAGNOSIS — Z87.891: ICD-10-CM

## 2020-11-16 DIAGNOSIS — J45.909: ICD-10-CM

## 2020-11-16 DIAGNOSIS — K21.00: Primary | ICD-10-CM

## 2020-11-16 DIAGNOSIS — D50.9: ICD-10-CM

## 2020-11-16 DIAGNOSIS — Z98.84: ICD-10-CM

## 2020-11-16 DIAGNOSIS — K44.9: ICD-10-CM

## 2020-11-16 DIAGNOSIS — I73.9: ICD-10-CM

## 2020-11-16 DIAGNOSIS — I10: ICD-10-CM

## 2020-11-16 DIAGNOSIS — Z79.899: ICD-10-CM

## 2020-11-16 PROCEDURE — 88305 TISSUE EXAM BY PATHOLOGIST: CPT

## 2020-11-16 PROCEDURE — 43239 EGD BIOPSY SINGLE/MULTIPLE: CPT

## 2020-11-16 RX ADMIN — POTASSIUM CHLORIDE SCH MLS: 14.9 INJECTION, SOLUTION INTRAVENOUS at 07:56

## 2020-11-16 RX ADMIN — POTASSIUM CHLORIDE SCH MLS: 14.9 INJECTION, SOLUTION INTRAVENOUS at 08:16

## 2020-11-16 NOTE — P.PCN
Date of Procedure: 11/16/20


Description of Procedure: 





BRIEF HISTORY: 


Patient is a 65-year-old male presenting for outpatient 

esophagogastroduodenoscopy for evaluation of GERD.  The patient reports a 

history of Annette-en-Y gastric bypass surgery.  He denies any abdominal pain, but 

does report symptoms of morning nausea.  He also reports iron deficiency. 





PROCEDURE PERFORMED: 


Esophagogastroduodenoscopy with biopsy.





PREOPERATIVE DIAGNOSIS: 


GERD, iron deficiency. 





ESTIMATED BLOOD LOSS: 


Minimal.





IV sedation per anesthesia. 





PROCEDURE: 


After informed consent was obtained, the patient  was brought into the endoscopy

unit. IV sedation was administered by Anesthesia under continuous monitoring. 

Initially the Olympus GIF-190 video endoscope was inserted into the mouth. 

Esophagus intubated without any difficulty. It was gradually advanced into the 

stomach and small bowel.  Both limbs of the Annette-en-Y appeared normal with 

biopsies of the jejunum taken.  The scope was then withdrawn to the anastomotic 

site which was intact.  A small gastric remnant was seen and biopsies were 

taken.  The scope was then withdrawn to the GE junction was located at 39 cm 

from the incisors,  with biopsies taken.  A 2 cm hiatal hernia was noted.  The 

esophagus appeared normal, with some mild erythema in the distal esophagus. 

There were no erosions or ulcerations seen and the patient tolerated the 

procedure well. 





IMPRESSION: 


1.  Small hiatal hernia.


2.  LA grade a distal esophagitis.


3.  Annette-en-Y gastric bypass.


4.  Biopsies of the jejunum, GE junction and gastric remnant.





RECOMMENDATIONS: 


The findings of this examination were discussed with the patient and his family.

 Okay to resume diet.  Okay to resume medications.  Await pathology from 

biopsies.

## 2020-12-20 ENCOUNTER — HOSPITAL ENCOUNTER (EMERGENCY)
Dept: HOSPITAL 47 - EC | Age: 65
Discharge: HOME | End: 2020-12-20
Payer: MEDICARE

## 2020-12-20 VITALS — DIASTOLIC BLOOD PRESSURE: 99 MMHG | SYSTOLIC BLOOD PRESSURE: 143 MMHG | HEART RATE: 82 BPM

## 2020-12-20 VITALS — TEMPERATURE: 98 F

## 2020-12-20 VITALS — RESPIRATION RATE: 18 BRPM

## 2020-12-20 DIAGNOSIS — Z87.891: ICD-10-CM

## 2020-12-20 DIAGNOSIS — T78.1XXA: ICD-10-CM

## 2020-12-20 DIAGNOSIS — F41.9: ICD-10-CM

## 2020-12-20 DIAGNOSIS — Z79.899: ICD-10-CM

## 2020-12-20 DIAGNOSIS — J45.909: ICD-10-CM

## 2020-12-20 DIAGNOSIS — I10: ICD-10-CM

## 2020-12-20 DIAGNOSIS — T78.3XXA: Primary | ICD-10-CM

## 2020-12-20 PROCEDURE — 96375 TX/PRO/DX INJ NEW DRUG ADDON: CPT

## 2020-12-20 PROCEDURE — 96374 THER/PROPH/DIAG INJ IV PUSH: CPT

## 2020-12-20 PROCEDURE — 99283 EMERGENCY DEPT VISIT LOW MDM: CPT

## 2020-12-20 NOTE — ED
General Adult HPI





- General


Chief complaint: Allergic Reaction


Stated complaint: Hives, Throat Swelling


Time Seen by Provider: 12/20/20 20:51


Source: patient, RN notes reviewed, old records reviewed


Mode of arrival: ambulatory


Limitations: no limitations





- History of Present Illness


Initial comments: 





65-year-old male presenting for suspected ALLERGIC reaction.  Patient developed 

generalized raised erythematous rash throughout his torso and upper extremities 

as well as troponin lip swelling approximate 15-20 minutes after eating 

strawberries.  He's had no previous reaction to strawberries that he knows of.  

He is on antihypertensive medication including lisinopril.  He denies difficulty

breathing.  He feels some swelling in his tongue and lips.  No vomiting.  No 

rash to the lower extremities.





- Related Data


                                Home Medications











 Medication  Instructions  Recorded  Confirmed


 


Diazepam 10 mg PO BID PRN 01/02/17 11/16/20


 


atenoloL [Atenolol] 50 mg PO QAM 01/02/17 11/16/20


 


Dextroamphetamine/Amphetamine 30 mg PO DAILY 01/17/18 11/16/20





[Adderall]   


 


Albuterol Sulfate [Proair Hfa] 1 puff INHALATION AS DIRECTED PRN 11/12/20 11/16/20


 


Ferrous Sulfate [Iron (65 MG 1 tab PO DAILY 11/12/20 11/16/20





Elemental)]   


 


Hydrochlorothiazide 12.5 mg PO QAM 11/12/20 11/16/20





[hydroCHLOROthiazide]   


 


lisinopriL 40 mg PO QAM 11/12/20 11/16/20








                                  Previous Rx's











 Medication  Instructions  Recorded


 


Famotidine [Pepcid] 20 mg PO DAILY #7 tablet 12/20/20


 


diphenhydrAMINE [Benadryl] 25 mg PO TID PRN #21 capsule 12/20/20


 


predniSONE 50 mg PO DAILY #5 tab 12/20/20











                                    Allergies











Allergy/AdvReac Type Severity Reaction Status Date / Time


 


No Known Allergies Allergy   Verified 12/20/20 20:48














Review of Systems


ROS Statement: 


Those systems with pertinent positive or pertinent negative responses have been 

documented in the HPI.





ROS Other: All systems not noted in ROS Statement are negative.





Past Medical History


Past Medical History: Asthma, Hypertension, Liver Disease


Additional Past Medical History / Comment(s): Hepatitis C.    Back pain/bulging 

discs/numbness down R leg.  GASTRIC ULCER


History of Any Multi-Drug Resistant Organisms: None Reported


Past Surgical History: Back Surgery, Bariatric Surgery, Heart Catheterization, 

Orthopedic Surgery


Additional Past Surgical History / Comment(s): Gastric bypass.  R rotator cuff 

repair.  R knee arthroscopy.  R carpal tunnel release. Cardiac cath-normal.


Past Anesthesia/Blood Transfusion Reactions: No Reported Reaction, Motion 

Sickness


Past Psychological History: Anxiety


Smoking Status: Former smoker


Past Alcohol Use History: None Reported


Past Drug Use History: None Reported





- Past Family History


  ** Father


Family Medical History: Hypertension


Additional Family Medical History / Comment(s): ETOH abuse





  ** Mother


Family Medical History: Dementia





General Exam


Limitations: no limitations


General appearance: alert, in no apparent distress


Head exam: Present: atraumatic, normocephalic


Eye exam: Present: other (Bilateral upper lid edema)


ENT exam: Absent: normal oropharynx (Soft tissue swelling posterior oropharynx, 

no uvular swelling, no stridor)


Neck exam: Present: normal inspection.  Absent: tenderness


Respiratory exam: Present: normal lung sounds bilaterally.  Absent: respiratory 

distress, wheezes, rhonchi, stridor


Cardiovascular Exam: Present: regular rate, normal rhythm


GI/Abdominal exam: Present: soft.  Absent: distended, tenderness, guarding


Extremities exam: Present: normal inspection, normal capillary refill.  Absent: 

pedal edema


Neurological exam: Present: alert


Psychiatric exam: Present: normal affect, normal mood


Skin exam: Present: warm, urticaria (Bilateral upper extremities, torso)





Course


                                   Vital Signs











  12/20/20 12/20/20





  20:44 21:43


 


Temperature 98 F 


 


Pulse Rate 79 


 


Respiratory 20 18





Rate  


 


Blood Pressure 140/95 146/90


 


O2 Sat by Pulse 95 98





Oximetry  














- Reevaluation(s)


Reevaluation #1: 





12/20/20 21:55


Patient reevaluated, resting, fluid, stable vitals, rash has improved.





Medical Decision Making





- Medical Decision Making





65-year-old male presenting with ALLERGIC reaction.  Patient has some minimal 

tongue and lip swelling.  No stridor.  No wheezing.  No vomiting.  He has 

diffuse urticarial rash.





Patient is given Benadryl, Pepcid, Solu-Medrol.  His rash improved.  His tongue 

or lip swelling improved.  He is monitored for several hours in the emergency 

department without any deterioration only improvement in his symptoms.








Is prescribed Benadryl, Pepcid, and steroids.  He is given ALLERGY follow-up as 

he's had recurrent episodes similar to this in the past multiple different 

foods.





Disposition


Clinical Impression: 


 Food allergy, Angioedema, Urticaria





Disposition: HOME SELF-CARE


Condition: Good


Instructions (If sedation given, give patient instructions):  Food Allergy (ED),

 Anaphylaxis (ED)


Prescriptions: 


diphenhydrAMINE [Benadryl] 25 mg PO TID PRN #21 capsule


 PRN Reason: Allergic Reaction


Famotidine [Pepcid] 20 mg PO DAILY #7 tablet


predniSONE 50 mg PO DAILY #5 tab


Is patient prescribed a controlled substance at d/c from ED?: No


Referrals: 


Kwan Montes MD [Primary Care Provider] - 1-2 days


Helio Martell MD [STAFF PHYSICIAN] - 1-2 days


Time of Disposition: 22:15

## 2021-01-19 ENCOUNTER — HOSPITAL ENCOUNTER (OUTPATIENT)
Dept: HOSPITAL 47 - LABWHC1 | Age: 66
Discharge: HOME | End: 2021-01-19
Attending: SURGERY
Payer: MEDICARE

## 2021-01-19 DIAGNOSIS — E21.1: ICD-10-CM

## 2021-01-19 DIAGNOSIS — D50.8: ICD-10-CM

## 2021-01-19 DIAGNOSIS — K50.90: ICD-10-CM

## 2021-01-19 DIAGNOSIS — E55.9: Primary | ICD-10-CM

## 2021-01-19 DIAGNOSIS — E66.01: ICD-10-CM

## 2021-01-19 DIAGNOSIS — K74.1: ICD-10-CM

## 2021-01-19 DIAGNOSIS — N19: ICD-10-CM

## 2021-01-19 DIAGNOSIS — K90.89: ICD-10-CM

## 2021-01-19 LAB
ALBUMIN SERPL-MCNC: 4.6 G/DL (ref 3.8–4.9)
ALBUMIN/GLOB SERPL: 2.3 G/DL (ref 1.6–3.17)
ALP SERPL-CCNC: 85 U/L (ref 41–126)
ALT SERPL-CCNC: 17 U/L (ref 10–49)
ANION GAP SERPL CALC-SCNC: 7 MMOL/L (ref 4–12)
APTT BLD: 36.3 SEC (ref 23.5–31)
AST SERPL-CCNC: 30 U/L (ref 14–35)
BUN SERPL-SCNC: 26 MG/DL (ref 9–27)
BUN/CREAT SERPL: 21.67 RATIO (ref 12–20)
CALCIUM SPEC-MCNC: 9.9 MG/DL (ref 8.7–10.3)
CHLORIDE SERPL-SCNC: 105 MMOL/L (ref 96–109)
CHOLEST SERPL-MCNC: 150 MG/DL (ref 0–200)
CO2 SERPL-SCNC: 30 MMOL/L (ref 21.6–31.8)
ERYTHROCYTE [DISTWIDTH] IN BLOOD BY AUTOMATED COUNT: 4.97 M/UL (ref 4.3–5.9)
ERYTHROCYTE [DISTWIDTH] IN BLOOD: 16.7 % (ref 11.5–15.5)
FERRITIN SERPL-MCNC: 18.9 NG/ML (ref 22–322)
GLOBULIN SER CALC-MCNC: 2 G/DL (ref 1.6–3.3)
GLUCOSE SERPL-MCNC: 97 MG/DL (ref 70–110)
HBA1C MFR BLD: 6 % (ref 4–6)
HCT VFR BLD AUTO: 45.2 % (ref 39–53)
HDLC SERPL-MCNC: 48 MG/DL (ref 40–60)
HGB BLD-MCNC: 15.2 GM/DL (ref 13–17.5)
INR PPP: 1.1 (ref 0.9–1.11)
IRON SERPL-MCNC: 129 UG/DL (ref 65–175)
LDLC SERPL CALC-MCNC: 75.4 MG/DL (ref 0–131)
MAGNESIUM SPEC-SCNC: 2.2 MG/DL (ref 1.5–2.4)
MCH RBC QN AUTO: 30.6 PG (ref 25–35)
MCHC RBC AUTO-ENTMCNC: 33.7 G/DL (ref 31–37)
MCV RBC AUTO: 90.9 FL (ref 80–100)
PLATELET # BLD AUTO: 226 K/UL (ref 150–450)
POTASSIUM SERPL-SCNC: 4.7 MMOL/L (ref 3.5–5.5)
PREALB SERPL-MCNC: 23 MG/DL (ref 18–42)
PROT SERPL-MCNC: 6.6 G/DL (ref 6.2–8.2)
PT BLD: 11.8 SEC (ref 9.9–11.9)
SODIUM SERPL-SCNC: 142 MMOL/L (ref 135–145)
TIBC SERPL-MCNC: 356 UG/DL (ref 228–460)
TRIGL SERPL-MCNC: 133 MG/DL (ref 0–149)
VIT B12 SERPL-MCNC: 357 PG/ML (ref 200–944)
VLDLC SERPL CALC-MCNC: 26.6 MG/DL (ref 5–40)
WBC # BLD AUTO: 7.5 K/UL (ref 3.8–10.6)

## 2021-01-19 PROCEDURE — 83036 HEMOGLOBIN GLYCOSYLATED A1C: CPT

## 2021-01-19 PROCEDURE — 83970 ASSAY OF PARATHORMONE: CPT

## 2021-01-19 PROCEDURE — 80053 COMPREHEN METABOLIC PANEL: CPT

## 2021-01-19 PROCEDURE — 82607 VITAMIN B-12: CPT

## 2021-01-19 PROCEDURE — 84255 ASSAY OF SELENIUM: CPT

## 2021-01-19 PROCEDURE — 84590 ASSAY OF VITAMIN A: CPT

## 2021-01-19 PROCEDURE — 84443 ASSAY THYROID STIM HORMONE: CPT

## 2021-01-19 PROCEDURE — 84134 ASSAY OF PREALBUMIN: CPT

## 2021-01-19 PROCEDURE — 85027 COMPLETE CBC AUTOMATED: CPT

## 2021-01-19 PROCEDURE — 82746 ASSAY OF FOLIC ACID SERUM: CPT

## 2021-01-19 PROCEDURE — 83735 ASSAY OF MAGNESIUM: CPT

## 2021-01-19 PROCEDURE — 84630 ASSAY OF ZINC: CPT

## 2021-01-19 PROCEDURE — 85730 THROMBOPLASTIN TIME PARTIAL: CPT

## 2021-01-19 PROCEDURE — 84425 ASSAY OF VITAMIN B-1: CPT

## 2021-01-19 PROCEDURE — 83540 ASSAY OF IRON: CPT

## 2021-01-19 PROCEDURE — 82525 ASSAY OF COPPER: CPT

## 2021-01-19 PROCEDURE — 82306 VITAMIN D 25 HYDROXY: CPT

## 2021-01-19 PROCEDURE — 84100 ASSAY OF PHOSPHORUS: CPT

## 2021-01-19 PROCEDURE — 80061 LIPID PANEL: CPT

## 2021-01-19 PROCEDURE — 82728 ASSAY OF FERRITIN: CPT

## 2021-01-19 PROCEDURE — 85610 PROTHROMBIN TIME: CPT

## 2021-01-19 PROCEDURE — 83550 IRON BINDING TEST: CPT

## 2021-01-19 PROCEDURE — 36415 COLL VENOUS BLD VENIPUNCTURE: CPT

## 2021-01-20 LAB — ZINC SERPL-MCNC: 86 UG/DL (ref 60–130)

## 2021-02-10 ENCOUNTER — HOSPITAL ENCOUNTER (OUTPATIENT)
Age: 66
Discharge: HOME | End: 2021-02-10
Payer: MEDICARE

## 2021-02-10 DIAGNOSIS — Z53.9: Primary | ICD-10-CM

## 2021-02-10 PROCEDURE — 99211 OFF/OP EST MAY X REQ PHY/QHP: CPT

## 2021-02-10 NOTE — P.PN
Subjective


Progress Note Date: 02/10/21





Patient left without being seen. 





Objective





- Vital Signs


Vital signs: 


                                   Vital Signs











Temp  97.7 F   02/10/21 13:11


 


Pulse  60   02/10/21 13:11


 


Resp  18   02/10/21 13:11


 


BP  137/80   02/10/21 13:11


 


Pulse Ox      








                                 Intake & Output











 02/09/21 02/10/21 02/10/21





 18:59 06:59 18:59


 


Weight   75.296 kg

## 2021-02-17 ENCOUNTER — HOSPITAL ENCOUNTER (OUTPATIENT)
Dept: HOSPITAL 47 - BARWHC3 | Age: 66
Discharge: HOME | End: 2021-02-17
Attending: SURGERY
Payer: MEDICARE

## 2021-02-17 DIAGNOSIS — Z53.9: Primary | ICD-10-CM

## 2021-04-09 ENCOUNTER — HOSPITAL ENCOUNTER (OUTPATIENT)
Dept: HOSPITAL 47 - LABWHC1 | Age: 66
Discharge: HOME | End: 2021-04-09
Attending: SURGERY
Payer: MEDICARE

## 2021-04-09 DIAGNOSIS — I71.2: Primary | ICD-10-CM

## 2021-04-09 LAB — BUN SERPL-SCNC: 44 MG/DL (ref 9–27)

## 2021-04-09 PROCEDURE — 82565 ASSAY OF CREATININE: CPT

## 2021-04-09 PROCEDURE — 36415 COLL VENOUS BLD VENIPUNCTURE: CPT

## 2021-04-09 PROCEDURE — 84520 ASSAY OF UREA NITROGEN: CPT

## 2021-04-19 ENCOUNTER — HOSPITAL ENCOUNTER (OUTPATIENT)
Dept: HOSPITAL 47 - RADCTMAIN | Age: 66
Discharge: HOME | End: 2021-04-19
Attending: SURGERY
Payer: MEDICARE

## 2021-04-19 DIAGNOSIS — I71.2: Primary | ICD-10-CM

## 2021-04-19 PROCEDURE — 71250 CT THORAX DX C-: CPT

## 2021-04-20 NOTE — CT
EXAMINATION TYPE: CT chest wo con

 

DATE OF EXAM: 4/19/2021

 

COMPARISON: CTA chest March 16, 2020 and older CTs

 

HISTORY: Thoracic aortic aneurysm without rupture.

 

CT DLP: 464 mGycm.  Automated Exposure Control for Dose Reduction was Utilized.

 

 

TECHNIQUE:  CT scan of the thorax is performed without IV contrast.

 

FINDINGS:

 

LUNGS: Mild underlying emphysematous change with mild to moderate right greater than left bibasilar l
inear scarring is redemonstrated. No suspicious focal consolidation or groundglass opacity currently.
 No pleural effusion or pneumothorax is seen bilaterally. No concerning nodules or masses.

 

MEDIASTINUM: Lack of IV contrast is noted to limit evaluation for mediastinal and especially hilar ad
enopathy. There are no definitive new greater than 1 cm hilar or mediastinal lymph nodes.   No cardio
megaly or pericardial effusion is seen. Coronary artery calcification is redemonstrated which is note
d marker for underlying coronary artery disease. Persistent enlarged main pulmonary artery at 3.7 cm 
axial image 29, CT findings consistent with underlying pulmonary artery hypertension. Adjacent ascend
ing aorta measures up to 4.1 cm in diameter coronal image 42. No significant change from prior studie
s. 

 

OTHER: Partial visualization of bilateral nonobstructing renal calculi. Surgical change at gastroesop
hageal junction is redemonstrated. Slight scoliotic curvature with multilevel spurring in the spine.

 

IMPRESSION: Stable 4.1 cm ascending aortic aneurysm.

## 2021-10-11 ENCOUNTER — HOSPITAL ENCOUNTER (OUTPATIENT)
Dept: HOSPITAL 47 - RADUSWWP | Age: 66
Discharge: HOME | End: 2021-10-11
Payer: MEDICARE

## 2021-10-11 DIAGNOSIS — N43.3: Primary | ICD-10-CM

## 2021-10-11 PROCEDURE — 93975 VASCULAR STUDY: CPT

## 2021-10-11 PROCEDURE — 76870 US EXAM SCROTUM: CPT

## 2021-10-11 NOTE — US
EXAMINATION TYPE: US scrotum with doppler.  Grayscale and color Doppler Duplex imaging performed of kylee cabrales scrotum.

 

DATE OF EXAM: 10/11/2021

 

COMPARISON: NONE

 

CLINICAL HISTORY: N50.89  disorders of the male genital organs. Pt states left testicle swelling x 2 
months, denies pain

 

 

EXAM MEASUREMENTS:

 

TESTICLES:

Right Testicle:  3.7 x 2.1 x 2.9 cm

Left Testicle:  3.5 x 2.6 x 2.8 cm

 

EPIDIDYMIS HEAD:

Right Epididymis:  1.0 cm

Left Epididymis:  1.1 cm  

 

Doppler performed to assess for testicular vascularity; bilateral color flow and waveforms are seen. 
  

 

Presence of hydroceles:  Small amount of septated fluid superior to right testicle, large complex/sep
tated fluid collection surrounding left testicle

 

Presence of varicoceles:  No

 

 

 

Testicular echotexture is homogenous and symmetric

 

IMPRESSION: Hydroceles are present bilaterally, larger on the left with septations, some internal casandra
ris

## 2021-10-28 ENCOUNTER — HOSPITAL ENCOUNTER (OUTPATIENT)
Dept: HOSPITAL 47 - ORWHC2ENDO | Age: 66
Discharge: HOME | End: 2021-10-28
Attending: SURGERY
Payer: MEDICARE

## 2021-10-28 VITALS — RESPIRATION RATE: 16 BRPM | SYSTOLIC BLOOD PRESSURE: 109 MMHG | DIASTOLIC BLOOD PRESSURE: 71 MMHG | HEART RATE: 55 BPM

## 2021-10-28 VITALS — TEMPERATURE: 97 F

## 2021-10-28 DIAGNOSIS — K20.91: ICD-10-CM

## 2021-10-28 DIAGNOSIS — K22.2: Primary | ICD-10-CM

## 2021-10-28 DIAGNOSIS — K22.11: ICD-10-CM

## 2021-10-28 DIAGNOSIS — K20.0: ICD-10-CM

## 2021-10-28 DIAGNOSIS — Z81.8: ICD-10-CM

## 2021-10-28 DIAGNOSIS — F41.9: ICD-10-CM

## 2021-10-28 DIAGNOSIS — J45.909: ICD-10-CM

## 2021-10-28 DIAGNOSIS — Z79.899: ICD-10-CM

## 2021-10-28 DIAGNOSIS — Z87.891: ICD-10-CM

## 2021-10-28 DIAGNOSIS — I10: ICD-10-CM

## 2021-10-28 DIAGNOSIS — Z81.1: ICD-10-CM

## 2021-10-28 DIAGNOSIS — Z98.890: ICD-10-CM

## 2021-10-28 DIAGNOSIS — Z97.2: ICD-10-CM

## 2021-10-28 DIAGNOSIS — Z98.84: ICD-10-CM

## 2021-10-28 DIAGNOSIS — Z91.018: ICD-10-CM

## 2021-10-28 DIAGNOSIS — D64.9: ICD-10-CM

## 2021-10-28 DIAGNOSIS — B19.20: ICD-10-CM

## 2021-10-28 DIAGNOSIS — Z82.49: ICD-10-CM

## 2021-10-28 DIAGNOSIS — M51.16: ICD-10-CM

## 2021-10-28 PROCEDURE — 88305 TISSUE EXAM BY PATHOLOGIST: CPT

## 2021-10-28 PROCEDURE — 43248 EGD GUIDE WIRE INSERTION: CPT

## 2021-10-28 PROCEDURE — 43239 EGD BIOPSY SINGLE/MULTIPLE: CPT

## 2021-10-28 PROCEDURE — 43249 ESOPH EGD DILATION <30 MM: CPT

## 2021-10-28 NOTE — P.GSHP
History of Present Illness


H&P Date: 10/28/21




















CHIEF COMPLAINT: Esophageal stricture





HISTORY OF PRESENT ILLNESS: The patient is a 66-year-old male who


presents reports dysphagia.  Upper endoscopy was offered for further evaluation 

and management.





PAST MEDICAL HISTORY: 


Please see list.





PAST SURGICAL HISTORY: 


Please see list.





MEDICATIONS: 


Please see list.





ALLERGIES:  Please see list. 





SOCIAL HISTORY: No illicit drug use





FAMILY HISTORY: No reports of Crohn disease or ulcerative colitis. 





REVIEW OF ORGAN SYSTEMS: 


CONSTITUTIONAL: No reports of fevers or chills. 


GI:  Denies any blood in stools or constipation. 





PHYSICAL EXAM: 


VITAL SIGNS:  Stable


GENERAL: Well-developed and pleasant in no acute distress. 


HEENT: No scleral icterus. Extraocular movements grossly


intact. Moist buccal mucosa. 


NECK: Supple without lymphadenopathy. 


CHEST: Unlabored respirations. Equal bilateral excursions. 


CARDIOVASCULAR: Regular rate and rhythm. Distal 2+ pulses. 


ABDOMEN: Soft, nondistended.  


MUSCULOSKELETAL: No clubbing, cyanosis, or edema. 





ASSESSMENT: 


1.  Esophageal stricture





PLAN: 


1. Recommend proceeding with an upper endoscopy with rigid dilators.





Past Medical History


Past Medical History: Asthma, Hypertension, Liver Disease


Additional Past Medical History / Comment(s): Hepatitis C.    Back pain/bulging 

discs/numbness down R leg.  GASTRIC ULCER


History of Any Multi-Drug Resistant Organisms: None Reported


Past Surgical History: Back Surgery, Bariatric Surgery, Heart Catheterization, 

Orthopedic Surgery


Additional Past Surgical History / Comment(s): Gastric bypass - 2000.  R rotator

cuff repair X2.  R knee arthroscopy.  R carpal tunnel release. Cardiac cath-

normal.


Past Anesthesia/Blood Transfusion Reactions: No Reported Reaction, Motion 

Sickness


Past Psychological History: Anxiety


Additional Psychological History / Comment(s): DR. LASHAE CURIEL WON'T WRITE A SCRIPT 

FOR [facility].


Smoking Status: Former smoker


Past Alcohol Use History: None Reported


Additional Past Alcohol Use History / Comment(s): Pt started smoking in 1969 and

quit in 1997.


Past Drug Use History: None Reported


Additional Drug Use History / Comment(s): States occassional marijuana use in 

the past but none now.





- Past Family History


  ** Father


Family Medical History: Hypertension


Additional Family Medical History / Comment(s): ETOH abuse





  ** Mother


Family Medical History: Dementia





Medications and Allergies


                                Home Medications











 Medication  Instructions  Recorded  Confirmed  Type


 


atenoloL [Atenolol] 50 mg PO QAM 01/02/17 10/25/21 History


 


Albuterol Sulfate [Proair Hfa] 1 puff INHALATION AS DIRECTED PRN 11/12/20 

10/25/21 History


 


Ferrous Sulfate [Iron (65 MG 1 tab PO DAILY 11/12/20 10/25/21 History





Elemental)]    


 


Hydrochlorothiazide 12.5 mg PO QAM 11/12/20 10/25/21 History





[hydroCHLOROthiazide]    








                                    Allergies











Allergy/AdvReac Type Severity Reaction Status Date / Time


 


strawberry Allergy  Rash/Hives Verified 10/28/21 07:33

## 2021-10-28 NOTE — P.PCN
Date of Procedure: 10/28/21


Description of Procedure: 





PREOPERATIVE DIAGNOSIS:


Anemia


Unintentional weight loss


Dysphagia


History of gastric bypass





POSTOPERATIVE DIAGNOSIS:


Upper esophageal stenosis


Lower esophageal ulcer


Gastric stenosis


Severe erosive esophagitis with bleeding





OPERATION:


1. Esophagogastrojejunoscopy with rigid dilatation, 57 Fr for dilation of upper 

esophageal stenosis


2. Esophagogastrojejunoscopy with balloon dilation for gastric stenosis, 20 mm 

balloon


3. Esophagogastrojejunoscopy with cold forceps biopsies distal esophagus





SURGEON: Larissa Alvarez MD





ANESTHESIA: MAC.





INDICATIONS:


The patient is a 66-year-old male who presents with dysphagia, nausea and 

vomiting, unintentional weight loss, anemia.  Benefits and risks of the 

procedure were described. Informed consent was obtained.





DESCRIPTION:


The patient was brought into the endoscopy suite and laid in the left lateral 

decubitus position.  After a timeout was confirmed, the procedure was initiated.

An Olympus gastroscope was passed along the posterior oropharynx down to the 

distal esophagus where the squamocolumnar junction was demonstrated active 

bleeding ulceration from severe erosive esophagitis.  Stenosis was identified of

the upper esophagus.  The gastric pouch was entered with stricture identified.  

Additional findings below.  A guidewire was placed through the scope.   The 

scope was removed.  A rigid dilator 57-Maori placed for dilation performed for 

2 minutes tortuous upper esophageal stenosis.  Next, a 20-mm [employer] 

balloon was deployed for dilation of the gastric stenosis.  Finally, cold 

forceps biopsies were obtained of the distal esophagus for active esophageal 

ulcerations.  The patient tolerated the procedure well.  





FINDINGS:


Esophageal stenosis upper esophagus addressed with rigid dilator, 57-Maori 


Gastrojejunal stenosis addressed with 20 mm balloon


Cold biopsy forceps obtained of the distal esophagus, 35 cm for LA grade C 

erosive esophagiti with ulceration and bleeding 





RECOMMENDATIONS:


1.  Repeat upper endoscopy one month





Plan - Discharge Summary


Discharge Rx Participant: No


New Discharge Prescriptions: 


New


   Sucralfate [Carafate] 1 gm PO BID #60 tablet


   Omeprazole [PriLOSEC] 40 mg PO DAILY #30 cap





Continue


   atenoloL [Atenolol] 50 mg PO QAM


   Albuterol Sulfate [Proair Hfa] 1 puff INHALATION AS DIRECTED PRN


     PRN Reason: Shortness Of Breath Or Wheezing


   Hydrochlorothiazide [hydroCHLOROthiazide] 12.5 mg PO QAM


   Ferrous Sulfate [Iron (65 MG Elemental)] 1 tab PO DAILY


Discharge Medication List





atenoloL [Atenolol] 50 mg PO QAM 01/02/17 [History]


Albuterol Sulfate [Proair Hfa] 1 puff INHALATION AS DIRECTED PRN 11/12/20 

[History]


Ferrous Sulfate [Iron (65 MG Elemental)] 1 tab PO DAILY 11/12/20 [History]


Hydrochlorothiazide [hydroCHLOROthiazide] 12.5 mg PO QAM 11/12/20 [History]


Omeprazole [PriLOSEC] 40 mg PO DAILY #30 cap 10/28/21 [Rx]


Sucralfate [Carafate] 1 gm PO BID #60 tablet 10/28/21 [Rx]








Follow up Appointment(s)/Referral(s): 


Larissa Alvarez MD [STAFF PHYSICIAN] - 11/02/21


Patient Instructions/Handouts:  Esophagitis (ED), Diet for Stomach Ulcers and 

Gastritis (GEN), Esophageal Dilation (DC)


Activity/Diet/Wound Care/Special Instructions: 


Avoid ibuprofen, Aleve, aspirin, nonsteroidal anti-inflammatories for active 

esophageal ulcer


Discharge Disposition: HOME SELF-CARE

## 2021-12-27 ENCOUNTER — HOSPITAL ENCOUNTER (OUTPATIENT)
Dept: HOSPITAL 47 - RADCTMAIN | Age: 66
Discharge: HOME | End: 2021-12-27
Attending: SURGERY
Payer: MEDICARE

## 2021-12-27 DIAGNOSIS — R11.10: Primary | ICD-10-CM

## 2021-12-27 DIAGNOSIS — N13.2: ICD-10-CM

## 2021-12-27 LAB — BUN SERPL-SCNC: 45 MG/DL (ref 9–20)

## 2021-12-27 PROCEDURE — 82565 ASSAY OF CREATININE: CPT

## 2021-12-27 PROCEDURE — 84520 ASSAY OF UREA NITROGEN: CPT

## 2021-12-27 PROCEDURE — 74176 CT ABD & PELVIS W/O CONTRAST: CPT

## 2021-12-27 PROCEDURE — 36415 COLL VENOUS BLD VENIPUNCTURE: CPT

## 2021-12-27 NOTE — CT
EXAMINATION TYPE: CT abdomen pelvis wo con

 

DATE OF EXAM: 12/27/2021

 

COMPARISON: 4/19/2019

 

HISTORY: Vomiting with meals, swollen testicle

 

CT DLP: 296.7 mGycm

 

Examination of the solid and hollow viscera is limited given the lack of contrast.

 

FINDINGS: 

 

LUNG BASES: No evidence for nodule. No evidence for infiltrate.

 

LIVER/GB: The gallbladder is unremarkable. No space-occupying hepatic lesion.

 

PANCREAS: No pancreatic mass identified. No inflammatory process seen.

 

SPLEEN: No evidence for splenomegaly. No intrasplenic lesions seen.

 

ADRENALS: No adrenal nodules identified. No evidence for thickening.

 

KIDNEYS: There is moderate to severe left-sided hydronephrosis likely secondary to an obstructing cali
culus proximal left ureter measuring 8.3 mm. There is a hypoattenuating collection lower pole left ki
dney which could reflect a urinoma. Lack of contrast limits evaluation. Collection measures 8.6 x 8.2
 x 7.2 cm. No internal air to suggest abscess. Nonobstructing calculus overlying the lower pole of th
e right kidney.

 

BOWEL: Appendix has a normal appearance. No evidence of bowel obstruction. No inflammatory process.

 

Lymph nodes: No evidence for adenopathy greater than 1 cm.

 

Abdominal aorta: Atheromatous changes seen. No evidence for aneurysm.

 

Genital organs: No significant abnormality.

 

Other: No significant abnormality.

 

IMPRESSION: 

There is moderate to severe left-sided hydronephrosis likely secondary to an obstructing calculus pro
ximal left ureter measuring 8.3 mm. There is a hypoattenuating collection lower pole left kidney whic
h could reflect a urinoma. Lack of contrast limits evaluation.

## 2022-02-07 ENCOUNTER — HOSPITAL ENCOUNTER (OUTPATIENT)
Dept: HOSPITAL 47 - LABPAT | Age: 67
Discharge: HOME | End: 2022-02-07
Attending: UROLOGY
Payer: MEDICARE

## 2022-02-07 DIAGNOSIS — Z01.818: Primary | ICD-10-CM

## 2022-02-07 DIAGNOSIS — N20.1: ICD-10-CM

## 2022-02-07 LAB
PH UR: 5.5 [PH] (ref 5–8)
SP GR UR: 1.02 (ref 1–1.03)
UROBILINOGEN UR QL STRIP: <2 MG/DL (ref ?–2)

## 2022-02-07 PROCEDURE — 80048 BASIC METABOLIC PNL TOTAL CA: CPT

## 2022-02-07 PROCEDURE — 87086 URINE CULTURE/COLONY COUNT: CPT

## 2022-02-07 PROCEDURE — 81003 URINALYSIS AUTO W/O SCOPE: CPT

## 2022-02-07 PROCEDURE — 85025 COMPLETE CBC W/AUTO DIFF WBC: CPT

## 2022-02-07 PROCEDURE — 93005 ELECTROCARDIOGRAM TRACING: CPT

## 2022-02-08 LAB
ANION GAP SERPL CALC-SCNC: 11.7 MMOL/L (ref 10–18)
BASOPHILS # BLD AUTO: 0.06 X 10*3/UL (ref 0–0.1)
BASOPHILS NFR BLD AUTO: 0.8 %
BUN SERPL-SCNC: 43.5 MG/DL (ref 9–27)
BUN/CREAT SERPL: 20.52 RATIO (ref 12–20)
CALCIUM SPEC-MCNC: 8.9 MG/DL (ref 8.7–10.3)
CHLORIDE SERPL-SCNC: 107 MMOL/L (ref 96–109)
CO2 SERPL-SCNC: 20.3 MMOL/L (ref 20–27.5)
EOSINOPHIL # BLD AUTO: 0.28 X 10*3/UL (ref 0.04–0.35)
EOSINOPHIL NFR BLD AUTO: 4 %
ERYTHROCYTE [DISTWIDTH] IN BLOOD BY AUTOMATED COUNT: 4.07 X 10*6/UL (ref 4.4–5.6)
ERYTHROCYTE [DISTWIDTH] IN BLOOD: 12.4 % (ref 11.5–14.5)
GLUCOSE SERPL-MCNC: 62 MG/DL (ref 70–110)
HCT VFR BLD AUTO: 39.2 % (ref 39.6–50)
HGB BLD-MCNC: 12.4 G/DL (ref 13–17)
IMM GRANULOCYTES BLD QL AUTO: 0.3 %
LYMPHOCYTES # SPEC AUTO: 1.24 X 10*3/UL (ref 0.9–5)
LYMPHOCYTES NFR SPEC AUTO: 17.5 %
MCH RBC QN AUTO: 30.5 PG (ref 27–32)
MCHC RBC AUTO-ENTMCNC: 31.6 G/DL (ref 32–37)
MCV RBC AUTO: 96.3 FL (ref 80–97)
MONOCYTES # BLD AUTO: 0.47 X 10*3/UL (ref 0.2–1)
MONOCYTES NFR BLD AUTO: 6.6 %
NEUTROPHILS # BLD AUTO: 5 X 10*3/UL (ref 1.8–7.7)
NEUTROPHILS NFR BLD AUTO: 70.8 %
NRBC BLD AUTO-RTO: 0 /100 WBCS (ref 0–0)
PLATELET # BLD AUTO: 217 X 10*3/UL (ref 140–440)
POTASSIUM SERPL-SCNC: 4.7 MMOL/L (ref 3.5–5.5)
SODIUM SERPL-SCNC: 139 MMOL/L (ref 135–145)
WBC # BLD AUTO: 7.07 X 10*3/UL (ref 4.5–10)

## 2022-02-11 NOTE — P.HPIHPCON
History of Present Illness


H&P Date: 02/11/22


Chief Complaint: left ureteral stone 





This is a 65 yo male with hx of 8 mm left proximal stone, Has failed medical 

expulsive therapy. option of ESWL and ureteroscopy were discussed with him in 

details.  He agreed to proceed with left-sided ureteroscopy.  Discussed the risk

which includes but not limited to bleeding, infection, injury to the ureter.  

Discussed also risks of anesthesia.  He understood all the risk and agreed to 

proceed


Consent for Procedure: 


I have explained the operation/procedure to the patient, including the risks, 

benefits, side effects, alternative therapies (including not receiving the 

proposed treatment or service), the likelihood of the patient achieving his/her 

goals, and potential recuperation problems for the procedure/sedation/analgesia,

as well as any blood products, if indicated. I also explained to the patient the

risks, benefits and side effects of the alternatives, as well as the risks 

related to not receiving the proposed procedure, care, treatment, or services.








Past Medical History


Past Medical History: Asthma, Hypertension, Liver Disease


Additional Past Medical History / Comment(s): Hepatitis C.    Back pain/bulging 

discs/numbness down R leg.  GASTRIC ULCER, KIDNEY STONES


History of Any Multi-Drug Resistant Organisms: None Reported


Past Surgical History: Back Surgery, Bariatric Surgery, Heart Catheterization, 

Orthopedic Surgery


Additional Past Surgical History / Comment(s): Gastric bypass - 2000.  R rotator

cuff repair X2.  R knee arthroscopy.  R carpal tunnel release. Cardiac cath-

normal.


Past Anesthesia/Blood Transfusion Reactions: No Reported Reaction, Motion 

Sickness


Smoking Status: Former smoker





- Past Family History


  ** Father


Family Medical History: Hypertension


Additional Family Medical History / Comment(s): ETOH abuse





  ** Mother


Family Medical History: Dementia





Medications and Allergies


                                Home Medications











 Medication  Instructions  Recorded  Confirmed  Type


 


atenoloL [Atenolol] 50 mg PO QAM 01/02/17 02/10/22 History


 


Albuterol Sulfate [Proair Hfa] 1 puff INHALATION AS DIRECTED PRN 11/12/20 

02/10/22 History


 


Ferrous Sulfate [Iron (65  mg PO DAILY 11/12/20 02/10/22 History





Elemental)]    


 


Hydrochlorothiazide 12.5 mg PO QAM 11/12/20 02/10/22 History





[hydroCHLOROthiazide]    


 


Multivit,Calc,Min/FA/K1/Lycop 1 each PO DAILY 12/29/21 02/10/22 History





[One-A-Day Men's Complete Tab]    


 


Multivitamin/Iron/Folic Acid 1 each PO DAILY 12/29/21 02/10/22 History





[Centrum Complete Multivit Tab]    


 


lisinopriL [Zestril] 40 mg PO DAILY 12/29/21 02/10/22 History


 


Omeprazole [PriLOSEC] 40 mg PO DAILY #90 cap 01/03/22 02/10/22 Rx








                                    Allergies











Allergy/AdvReac Type Severity Reaction Status Date / Time


 


alcohol Allergy  Anaphylaxis Verified 02/10/22 10:23


 


strawberry Allergy  Rash/Hives Verified 01/03/22 11:44














Surgical - Exam





- General


no distress, moderate pain





- Respiratory


normal expansion, normal respiratory effort





- Abdomen


Abdomen: soft, non tender





- Psychiatric


oriented to time, oriented to person, oriented to place





Assessment and Plan


Assessment: 





65 yo male with history of a 8 mm left-sided ureteral stone


-OR for left-sided ureteroscopy holmium laser lithotripsy stone basketing and 

stent insertion

## 2022-02-14 ENCOUNTER — HOSPITAL ENCOUNTER (OUTPATIENT)
Dept: HOSPITAL 47 - OR | Age: 67
Discharge: HOME | End: 2022-02-14
Attending: UROLOGY
Payer: MEDICARE

## 2022-02-14 VITALS — RESPIRATION RATE: 16 BRPM

## 2022-02-14 VITALS — DIASTOLIC BLOOD PRESSURE: 75 MMHG | HEART RATE: 57 BPM | SYSTOLIC BLOOD PRESSURE: 156 MMHG

## 2022-02-14 VITALS — BODY MASS INDEX: 23.9 KG/M2

## 2022-02-14 VITALS — TEMPERATURE: 96.8 F

## 2022-02-14 DIAGNOSIS — N13.5: ICD-10-CM

## 2022-02-14 DIAGNOSIS — N20.1: Primary | ICD-10-CM

## 2022-02-14 PROCEDURE — 74018 RADEX ABDOMEN 1 VIEW: CPT

## 2022-02-14 PROCEDURE — 74420 UROGRAPHY RTRGR +-KUB: CPT

## 2022-02-14 PROCEDURE — 52332 CYSTOSCOPY AND TREATMENT: CPT

## 2022-02-14 NOTE — FL
EXAMINATION TYPE: FL Urography Retrograde

 

DATE OF EXAM: 2/14/2022

 

HISTORY: Fluoroscopy  time

 

1 minute and 8 seconds of fluoroscopy provided. 

 

IMPRESSION:

1. Fluoroscopy time.  

 

MTDD

## 2022-02-14 NOTE — XR
EXAMINATION TYPE: XR KUB

 

DATE OF EXAM: 2/14/2022

 

COMPARISON: 7/6/2015

 

HISTORY: Right lower quadrant pain

 

TECHNIQUE: Single view supine

 

FINDINGS: There is no sign of intestinal obstruction or pneumoperitoneum. Fecal pattern is normal. Th
ere is no sign of a mass. There are no pathologic calcifications over the kidneys. There is mild lumb
ar dextroscoliosis.

 

IMPRESSION: Nonacute abdomen. No adverse change.

## 2022-02-14 NOTE — P.OP
Date of Procedure: 02/14/22


Preoperative Diagnosis: 


Left ureteral stone


Postoperative Diagnosis: 


Left ureteral stone, ureteral stricture


Procedure(s) Performed: 


Cystoscopy, left retrograde, and stent insertion


Implants: 


4.5-Pakistani by 26 cm stent


Anesthesia: AVINASH


Surgeon: Ranjit Modi


Estimated Blood Loss (ml): 20


Pathology: none sent


Condition: stable


Disposition: PACU


Indications for Procedure: 


This is a 67 yo male with hx of 8 mm left proximal stone, Has failed medical 

expulsive therapy. option of ESWL and ureteroscopy were discussed with him in 

details.  He agreed to proceed with left-sided ureteroscopy.  Discussed the risk

which includes but not limited to bleeding, infection, injury to the ureter.  

Discussed also risks of anesthesia.  He understood all the risk and agreed to 

proceed


Operative Findings: 


Severe narrowing of the mid ureter, with dilation proximal to that.  Severe 

hydroureteronephrosis


Description of Procedure: 


Patient was brought to the operating room, general anesthesia was induced.  He 

was prepped and draped in sterile fashion and placed in a dorsal lithotomy 

position.  Cystoscopy fitted with a 21-Pakistani sheath was inserted per urethra, 

cystoscopy was performed which showed a significantly enlarged median lobe, 

heavily trabeculated bladder.  Attention was then carried to the left ureteral 

orifice which was intubated with an open-ended catheter, retrograde pyelogram 

was performed which showed significant pinpoint narrowing of the mid ureter, the

narrowing was approximately 4 cm, with severe dilation proximal to that.  There 

was a filling defect at the level of the transition between the narrowed and the

dilated ureter, at the level of the stone.  Next attempted to pass a sensor wire

through the open-ended catheter but resistance was met at the stricture portion 

of the ureter.  At this time I had used a 0.025 Glidewire I was able to navigate

that past the stricture and into the kidney.  At this time given this finding 

decision was made to proceed with a stent placement.  I attempted to pass a 6-

Pakistani stent over the wire, but resistance was met at the stricture, at this 

time I switched to a 4.5-Pakistani stent and I was able to pass the stent past the 

narrowed portion of this of the ureter and into the collecting system, there was

a hydronephrotic drip noticed after stent placement.  At this time there was 

some evidence of prostatic backbleeding, decision was made to place a Preston 

catheter.  The scope was removed and a 18-Pakistani Preston catheter was placed with 

return of light pink urine.  Patient tolerated the procedure was taken to 

recovery in stable condition

## 2022-03-10 NOTE — P.HPIHPCON
History of Present Illness


H&P Date: 03/10/22





This is a 67 yo male with hx of 8 mm left proximal stone, Has underwent left-

sided stented placement in February, a ureteral stricture was encountered at 

that time, of note only 4.8 Fr stent was able to pass past the stricture. option

of repeat ureteroscopy was discussed with him in details.  He agreed to proceed 

with left-sided ureteroscopy. discussed with him if there was a persistent 

stricture at the left ureter, then the next step would be a PCNL to address his 

stone.  Discussed the risk which includes but not limited to bleeding, 

infection, injury to the ureter.  Discussed also risks of anesthesia.  He 

understood all the risk and agreed to proceed


Consent for Procedure: 


I have explained the operation/procedure to the patient, including the risks, 

benefits, side effects, alternative therapies (including not receiving the 

proposed treatment or service), the likelihood of the patient achieving his/her 

goals, and potential recuperation problems for the procedure/sedation/analgesia,

as well as any blood products, if indicated. I also explained to the patient the

risks, benefits and side effects of the alternatives, as well as the risks 

related to not receiving the proposed procedure, care, treatment, or services.








Past Medical History


Past Medical History: Asthma, Hypertension, Liver Disease


Additional Past Medical History / Comment(s): Hepatitis C.    Back pain/bulging 

discs/numbness down R leg.  GASTRIC ULCER, KIDNEY STONES


History of Any Multi-Drug Resistant Organisms: None Reported


Past Surgical History: Back Surgery, Bariatric Surgery, Heart Catheterization, 

Orthopedic Surgery


Additional Past Surgical History / Comment(s): Gastric bypass - 2000.  R rotator

cuff repair X2.  R knee arthroscopy.  R carpal tunnel release. SX FOR KIDNEY 

STONES


Past Anesthesia/Blood Transfusion Reactions: No Reported Reaction, Motion 

Sickness


Smoking Status: Former smoker





- Past Family History


  ** Father


Family Medical History: Hypertension


Additional Family Medical History / Comment(s): ETOH abuse





  ** Mother


Family Medical History: Dementia





Medications and Allergies


                                Home Medications











 Medication  Instructions  Recorded  Confirmed  Type


 


atenoloL [Atenolol] 50 mg PO QAM 01/02/17 03/10/22 History


 


Albuterol Sulfate [Proair Hfa] 1 puff INHALATION AS DIRECTED PRN 11/12/20 

03/10/22 History


 


Hydrochlorothiazide 12.5 mg PO QAM 11/12/20 03/10/22 History





[hydroCHLOROthiazide]    


 


Multivit,Calc,Min/FA/K1/Lycop 1 each PO DAILY 12/29/21 03/10/22 History





[One-A-Day Men's Complete Tab]    


 


lisinopriL [Zestril] 40 mg PO DAILY 12/29/21 03/10/22 History


 


Omeprazole [PriLOSEC] 40 mg PO DAILY #90 cap 01/03/22 03/10/22 Rx








                                    Allergies











Allergy/AdvReac Type Severity Reaction Status Date / Time


 


alcohol Allergy  Anaphylaxis Verified 02/10/22 10:23


 


strawberry Allergy  Rash/Hives Verified 01/03/22 11:44














Surgical - Exam





- General


no distress, no pain





- Eyes


normal ocular movement





- ENT


normal nares, normal mucosa





- Respiratory


normal expansion, normal respiratory effort





- Abdomen


Abdomen: soft, non tender





Assessment and Plan


Assessment: 





OR for left-sided ureteroscopy with holmium laser lithotripsy, stone basketing, 

stent exchange versus removal

## 2022-03-14 ENCOUNTER — HOSPITAL ENCOUNTER (OUTPATIENT)
Dept: HOSPITAL 47 - OR | Age: 67
Discharge: HOME | End: 2022-03-14
Attending: UROLOGY
Payer: MEDICARE

## 2022-03-14 VITALS — SYSTOLIC BLOOD PRESSURE: 146 MMHG | HEART RATE: 65 BPM | DIASTOLIC BLOOD PRESSURE: 77 MMHG

## 2022-03-14 VITALS — RESPIRATION RATE: 16 BRPM

## 2022-03-14 VITALS — BODY MASS INDEX: 22.8 KG/M2

## 2022-03-14 VITALS — TEMPERATURE: 96.8 F

## 2022-03-14 DIAGNOSIS — B19.20: ICD-10-CM

## 2022-03-14 DIAGNOSIS — I25.10: ICD-10-CM

## 2022-03-14 DIAGNOSIS — N20.1: Primary | ICD-10-CM

## 2022-03-14 DIAGNOSIS — J44.9: ICD-10-CM

## 2022-03-14 DIAGNOSIS — Z87.891: ICD-10-CM

## 2022-03-14 DIAGNOSIS — E78.5: ICD-10-CM

## 2022-03-14 DIAGNOSIS — M19.90: ICD-10-CM

## 2022-03-14 DIAGNOSIS — J45.909: ICD-10-CM

## 2022-03-14 DIAGNOSIS — K21.9: ICD-10-CM

## 2022-03-14 DIAGNOSIS — I10: ICD-10-CM

## 2022-03-14 PROCEDURE — 88305 TISSUE EXAM BY PATHOLOGIST: CPT

## 2022-03-14 PROCEDURE — 74420 UROGRAPHY RTRGR +-KUB: CPT

## 2022-03-14 PROCEDURE — 50955 URETER ENDOSCOPY & BIOPSY: CPT

## 2022-03-14 NOTE — FL
Fluoroscopy

 

HISTORY: Left ureteral stone

 

17 seconds fluoroscopy time supplied to the referring clinician.  1 intraoperative C-arm images docum
ent the procedure. See dictated report from urology.

## 2022-03-14 NOTE — P.OP
Date of Procedure: 03/14/22


Preoperative Diagnosis: 


Left ureteral stone


Postoperative Diagnosis: 


Left ureteral stone, ureteral mass


Procedure(s) Performed: 


Cystoscopy, left retrograde pyelogram, ureteroscopy, ureteral mass biopsy and 

stent exchange


Implants: 


6-Spanish by 26 cm stent


Anesthesia: AVINASH


Surgeon: Ranjit Modi


Estimated Blood Loss (ml): 10


Pathology: other (Left ureteral mass biopsy)


Condition: stable


Disposition: PACU


Indications for Procedure: 


This is a 65 yo male with hx of 8 mm left proximal stone, Has underwent left-

sided stented placement in February, a ureteral stricture was encountered at 

that time, of note only 4.8 Fr stent was able to pass past the stricture. option

of repeat ureteroscopy was discussed with him in details.  He agreed to proceed 

with left-sided ureteroscopy. discussed with him if there was a persistent 

stricture at the left ureter, then the next step would be a PCNL to address his 

stone.  Discussed the risk which includes but not limited to bleeding, 

infection, injury to the ureter.  Discussed also risks of anesthesia.  He unders

tood all the risk and agreed to proceed


Operative Findings: 


Left mid ureteral obstruction, and edematous mass was seen protruding from the 

obstruction


Description of Procedure: 


Patient was brought to the operating room, general anesthesia was induced.  He 

was prepped and draped in sterile fashion and placed in dorsal lithotomy 

position.  A cystoscopy fitted with a 21-Spanish sheath was inserted per urethra,

cystoscopy was performed which showed no abnormality within the bladder.  The 

left ureteral stent was grasped and removed to the meatus.  Next a sensor wire 

was advanced through the stent and the stent was removed with the wire in place.

 Next a 6 Fr open-ended catheter was passed over the wire, retrograde pyelogram 

was performed through the catheter which showed persistent narrowing in the mid 

ureter with proximal dilation.  Next a sensor wire was advanced through the 

catheter and the catheter was removed with the wire in place.  Next a semirigid 

ureteroscope was inserted and advanced up the left ureteral orifice.  At this 

time at the mid ureter the ureter appeared to be completely obstructed.  There 

was a edematous mass protruding from the area of narrowing.  Using the biopsy 

forceps a biopsy of the mass was obtained, multiple biopsies were obtained.  I 

attempted to advance the ureteroscope past the narrowing but was unsuccessful, a

then tried with a flexible ureteroscope to pass past the narrowing but was still

unsuccessful.  Next a balloon dilator was passed over the wire, attempted to 

dilate narrowing was unsuccessful as there continued to be waist at the balloon.

 At this time the balloon dilator was removed and a ureteral stent was passed 

over the wire, the proximal curl was visualized on fluoroscopy and the distal 

curl was visualized using cystoscope.  The bladder was emptied at the end of the

case.  Patient thought the procedure was taken to recovery in stable condition. 

At this time we'll await pathology results and obtain a CT abdomen and pelvis to

further evaluate the ureteral obstruction.

## 2022-03-29 ENCOUNTER — HOSPITAL ENCOUNTER (OUTPATIENT)
Dept: HOSPITAL 47 - RADCTMAIN | Age: 67
Discharge: HOME | End: 2022-03-29
Attending: UROLOGY
Payer: MEDICARE

## 2022-03-29 DIAGNOSIS — N13.30: Primary | ICD-10-CM

## 2022-03-29 LAB — BUN SERPL-SCNC: 32 MG/DL (ref 9–20)

## 2022-03-29 PROCEDURE — 84520 ASSAY OF UREA NITROGEN: CPT

## 2022-03-29 PROCEDURE — 82565 ASSAY OF CREATININE: CPT

## 2022-04-13 ENCOUNTER — HOSPITAL ENCOUNTER (OUTPATIENT)
Dept: HOSPITAL 47 - RADCTMAIN | Age: 67
Discharge: HOME | End: 2022-04-13
Attending: UROLOGY
Payer: MEDICARE

## 2022-04-13 DIAGNOSIS — N20.1: Primary | ICD-10-CM

## 2022-04-13 DIAGNOSIS — N28.1: ICD-10-CM

## 2022-04-13 PROCEDURE — 74176 CT ABD & PELVIS W/O CONTRAST: CPT

## 2022-04-13 NOTE — CT
EXAMINATION TYPE: CT abdomen pelvis wo con

 

DATE OF EXAM: 4/13/2022

 

COMPARISON: CT dated 12/27/2021

 

HISTORY: left flank pain

 

CT DLP: 491 mGycm

Automated exposure control for dose reduction was used.

 

TECHNIQUE:  Helical acquisition of images was performed from the lung bases through the pelvis.

 

FINDINGS: 

 

LUNG BASES: COPD changes.

 

LIVER/GB: Scattered hepatic hypodensities, stable compared to the previous and likely representing he
patic cysts. Grossly unremarkable gallbladder.

 

PANCREAS: Grossly unremarkable.

 

SPLEEN: Slight anterior position of the spleen, unchanged.

 

ADRENALS: No significant abnormality is seen.

 

KIDNEYS: Interval progression of the previously seen large lobulated cyst/cystic structure at the low
er pole of the left kidney measuring up to 11.8 cm compared to 7.2 cm previously. Suspected curviline
ar hyperdensity seen at the inferior aspect of the cyst which could represent blood however solid com
ponent cannot be excluded. Interval insertion of a double-J left ureteric stent, apparently in adequa
te position. Few tiny millimetric stone fragments seen in the upper left ureter around the stent marisol
uring up to 4 mm. Few millimetric calculi are seen in the right kidney measuring up to 2 mm with few 
scattered right renal cysts. Suspected millimetric hemorrhagic cyst at the midpole of the right kidne
y. No hydronephrosis bilaterally. No right-sided hydroureter. Thickened wall of the urinary bladder, 
please correlate with urinalysis results.

 

FREE AIR:  No free air is visualized

 

RETROPERITONEAL ADENOPATHY:  None visualized

 

REPRODUCTIVE ORGANS: Prominent prostate, please correlate with PSA level.

 

PELVIC ADENOPATHY:  None visualized.

 

OSSEOUS STRUCTURES:  Severe degenerative changes at L4-5 level with fused L2, L3 and L4 vertebral bod
ies. Degenerative changes of the hip joints.

 

BOWEL:  Hiatal hernia with previous gastric surgery. No evidence of bowel obstruction. Scattered hypo
densities throughout the abdomen, possibly within the bowel. Suboptimal assessment of the small and l
arge bowel due to paucity of intra-abdominal fat and lack of oral contrast and IV contrast demonstrat
ion. Markedly hyperdense/calcified structure is seen at the anterior aspect of the left psoas muscle 
inseparable from the described cystic structure at the lower pole of the left kidney measuring 4.4 x 
6.5 cm compared to 3.7 x 5.7 mm previously. It could represent dense contrast within small bowel yet 
soft tissue lesion with dense calcification or other calcified neoplastic process can't be excluded. 
The interval progression is concerning.

 

OTHER: Scattered arterial atherosclerotic calcifications. No sizable ascites. Suspected left scrotal 
hydrocele.

 

IMPRESSION:

1. Interval insertion of the left-sided double-J ureteric stent with residual few stone fragments in 
the left upper ureter as described above.

2. Interval progression of the cystic structure at the lower pole of the left kidney which could repr
esent progressive a urinoma however other cystic lesion or internal bleeding cannot  be excluded as d
escribed above.

3. Progression of markedly hyperdense/calcified structure in the left retroperitoneum as detailed abo
ve. It could be related to contrast within small bowel yet it is concerning for calcified soft tissue
 lesion or calcified neoplastic process. This is suboptimally assessed due to the lack of oral and IV
 contrast administration. Recommend further PET/CT scan assessment. Other interval changes and incide
ntal findings as described above.

## 2022-05-13 ENCOUNTER — HOSPITAL ENCOUNTER (OUTPATIENT)
Dept: HOSPITAL 47 - RADCTMAIN | Age: 67
Discharge: HOME | End: 2022-05-13
Attending: SURGERY
Payer: MEDICARE

## 2022-05-13 DIAGNOSIS — I71.2: Primary | ICD-10-CM

## 2022-05-13 DIAGNOSIS — R91.1: ICD-10-CM

## 2022-05-13 DIAGNOSIS — N13.30: ICD-10-CM

## 2022-05-13 PROCEDURE — 71250 CT THORAX DX C-: CPT

## 2022-05-13 NOTE — CT
EXAMINATION TYPE: CT chest wo con

 

DATE OF EXAM: 5/13/2022

 

COMPARISON: 4/19/2021

 

HISTORY: Thoracic aortic aneurysm without rupture. Patient having no complaints at time of scan.

 

CT DLP: 221 mGycm.  Automated Exposure Control for Dose Reduction was Utilized.

 

TECHNIQUE:  CT scan of the thorax is performed without IV contrast.

 

FINDINGS:

 

The lungs are clear of consolidative, interstitial or masslike density. There is a 3 to 4 mm subpleur
al parenchymal nodule in the right upper lobe medially which was seen previously and is stable.

 

The ascending thoracic aorta remain stable at 4 cm in AP dimension. There is no mediastinal, hilar or
 axillary adenopathy.

 

There are postsurgical changes at the gastroesophageal junction which are stable.

 

Limited scanning the upper abdomen reveals interval development of marked left hydronephrosis. There 
is a stent within the left renal collecting system and proximal ureter.

 

Given the lack of contrast evaluation is limited but there appears to be a large perinephric fluid co
llection posterior to the left kidney which could represent a urinoma. Hemorrhage is not excluded.

 

IMPRESSION:

1. Stable mild aneurysmal dilatation of the ascending thoracic aorta.

2. Stable tiny right upper lobe pulmonary nodule.

3. Stable gastroesophageal surgical changes.

4. Interval development of marked left hydronephrosis with a ureteral stent identified within the pro
ximal left ureter and collecting system. There is a large perinephric fluid collection which could re
present a urinoma or hemorrhage, correlation is recommended.

## 2022-07-12 ENCOUNTER — HOSPITAL ENCOUNTER (EMERGENCY)
Dept: HOSPITAL 47 - EC | Age: 67
LOS: 1 days | Discharge: HOME | End: 2022-07-13
Payer: MEDICARE

## 2022-07-12 VITALS — TEMPERATURE: 97.9 F

## 2022-07-12 DIAGNOSIS — N28.89: ICD-10-CM

## 2022-07-12 DIAGNOSIS — Z88.8: ICD-10-CM

## 2022-07-12 DIAGNOSIS — N50.89: Primary | ICD-10-CM

## 2022-07-12 DIAGNOSIS — Z79.899: ICD-10-CM

## 2022-07-12 DIAGNOSIS — J45.909: ICD-10-CM

## 2022-07-12 DIAGNOSIS — Z87.891: ICD-10-CM

## 2022-07-12 DIAGNOSIS — I10: ICD-10-CM

## 2022-07-12 DIAGNOSIS — Z91.018: ICD-10-CM

## 2022-07-12 PROCEDURE — 99284 EMERGENCY DEPT VISIT MOD MDM: CPT

## 2022-07-12 PROCEDURE — 80053 COMPREHEN METABOLIC PANEL: CPT

## 2022-07-12 PROCEDURE — 74176 CT ABD & PELVIS W/O CONTRAST: CPT

## 2022-07-12 PROCEDURE — 96374 THER/PROPH/DIAG INJ IV PUSH: CPT

## 2022-07-12 PROCEDURE — 87086 URINE CULTURE/COLONY COUNT: CPT

## 2022-07-12 PROCEDURE — 36415 COLL VENOUS BLD VENIPUNCTURE: CPT

## 2022-07-12 PROCEDURE — 96361 HYDRATE IV INFUSION ADD-ON: CPT

## 2022-07-12 PROCEDURE — 96375 TX/PRO/DX INJ NEW DRUG ADDON: CPT

## 2022-07-12 PROCEDURE — 81001 URINALYSIS AUTO W/SCOPE: CPT

## 2022-07-12 PROCEDURE — 85025 COMPLETE CBC W/AUTO DIFF WBC: CPT

## 2022-07-13 VITALS — SYSTOLIC BLOOD PRESSURE: 135 MMHG | HEART RATE: 54 BPM | RESPIRATION RATE: 16 BRPM | DIASTOLIC BLOOD PRESSURE: 75 MMHG

## 2022-07-13 LAB
ALBUMIN SERPL-MCNC: 3.8 G/DL (ref 3.5–5)
ALP SERPL-CCNC: 188 U/L (ref 38–126)
ALT SERPL-CCNC: 9 U/L (ref 4–49)
ANION GAP SERPL CALC-SCNC: 7 MMOL/L
AST SERPL-CCNC: 20 U/L (ref 17–59)
BASOPHILS # BLD AUTO: 0 K/UL (ref 0–0.2)
BASOPHILS NFR BLD AUTO: 1 %
BUN SERPL-SCNC: 36 MG/DL (ref 9–20)
CALCIUM SPEC-MCNC: 8.6 MG/DL (ref 8.4–10.2)
CHLORIDE SERPL-SCNC: 105 MMOL/L (ref 98–107)
CO2 SERPL-SCNC: 25 MMOL/L (ref 22–30)
EOSINOPHIL # BLD AUTO: 0.3 K/UL (ref 0–0.7)
EOSINOPHIL NFR BLD AUTO: 4 %
ERYTHROCYTE [DISTWIDTH] IN BLOOD BY AUTOMATED COUNT: 4.07 M/UL (ref 4.3–5.9)
ERYTHROCYTE [DISTWIDTH] IN BLOOD: 12.7 % (ref 11.5–15.5)
GLUCOSE SERPL-MCNC: 92 MG/DL (ref 74–99)
HCT VFR BLD AUTO: 38.4 % (ref 39–53)
HGB BLD-MCNC: 12.5 GM/DL (ref 13–17.5)
LYMPHOCYTES # SPEC AUTO: 1.3 K/UL (ref 1–4.8)
LYMPHOCYTES NFR SPEC AUTO: 18 %
MCH RBC QN AUTO: 30.8 PG (ref 25–35)
MCHC RBC AUTO-ENTMCNC: 32.6 G/DL (ref 31–37)
MCV RBC AUTO: 94.3 FL (ref 80–100)
MONOCYTES # BLD AUTO: 0.3 K/UL (ref 0–1)
MONOCYTES NFR BLD AUTO: 5 %
NEUTROPHILS # BLD AUTO: 5.2 K/UL (ref 1.3–7.7)
NEUTROPHILS NFR BLD AUTO: 72 %
PH UR: 5.5 [PH] (ref 5–8)
PLATELET # BLD AUTO: 204 K/UL (ref 150–450)
POTASSIUM SERPL-SCNC: 4.4 MMOL/L (ref 3.5–5.1)
PROT SERPL-MCNC: 6.5 G/DL (ref 6.3–8.2)
PROT UR QL: (no result)
RBC UR QL: 15 /HPF (ref 0–5)
SODIUM SERPL-SCNC: 137 MMOL/L (ref 137–145)
SP GR UR: 1.02 (ref 1–1.03)
UROBILINOGEN UR QL STRIP: <2 MG/DL (ref ?–2)
WBC # BLD AUTO: 7.3 K/UL (ref 3.8–10.6)
WBC # UR AUTO: 31 /HPF (ref 0–5)

## 2022-07-13 NOTE — ED
General Adult HPI





<Lino Moore - Last Filed: 07/13/22 06:06>





- General


Source: patient, RN notes reviewed


Mode of arrival: ambulatory





<Marina Butler - Last Filed: 07/15/22 00:37>





- General


Chief complaint: Abdominal Pain


Stated complaint: L sided abd swelling,back pain


Time Seen by Provider: 07/12/22 23:46





- History of Present Illness


Initial comments: 





67-year-old male presents to emergency department for evaluation of left sided 

abdominal pain that has been an ongoing issue for the past 2 years.  Patient 

states he has a known kidney stone that has been unsuccessfully operated on 

twice.  States he feels as if he "is swollen on the inside."  Reports discomfort

wraps around to the left flank and extends to the left testicle.  States he has 

been seen by his PCP and urology in the past, though none recently.  Denies 

fever, chills, headache, chest pain, difficulty breathing, diarrhea, 

constipation, dysuria, hematuria, and urinary retention. (Marina Butler)





- Related Data


                                Home Medications











 Medication  Instructions  Recorded  Confirmed


 


atenoloL [Atenolol] 50 mg PO QAM 01/02/17 03/14/22


 


Albuterol Sulfate [Proair Hfa] 1 puff INHALATION AS DIRECTED PRN 11/12/20 03/14/22


 


hydroCHLOROthiazide 12.5 mg PO QAM 11/12/20 03/14/22


 


Multivit,Calc,Min/FA/K1/Lycop 1 each PO DAILY 12/29/21 03/14/22





[One-A-Day Men's Complete Tab]   


 


lisinopriL [Zestril] 40 mg PO DAILY 12/29/21 03/14/22








                                  Previous Rx's











 Medication  Instructions  Recorded


 


Omeprazole [PriLOSEC] 40 mg PO DAILY #90 cap 01/03/22


 


Cephalexin [Keflex] 500 mg PO Q12HR #10 cap 03/14/22


 


traMADol HCl [Ultram] 50 mg PO Q6HR PRN 3 Days #8 tab 03/14/22











                                    Allergies











Allergy/AdvReac Type Severity Reaction Status Date / Time


 


alcohol Allergy  Anaphylaxis Verified 07/12/22 23:21


 


strawberry Allergy  Rash/Hives Verified 07/12/22 23:21














Review of Systems


ROS Other: All systems not noted in ROS Statement are negative.





<Lino Moore - Last Filed: 07/13/22 06:06>


ROS Other: All systems not noted in ROS Statement are negative.





<Marina Butler - Last Filed: 07/15/22 00:37>


ROS Statement: 


Those systems with pertinent positive or pertinent negative responses have been 

documented in the HPI.








Past Medical History


Past Medical History: Asthma, Hypertension, Liver Disease


Additional Past Medical History / Comment(s): Hepatitis C.    Back pain/bulging 

discs/numbness down R leg.  GASTRIC ULCER, arthritis


History of Any Multi-Drug Resistant Organisms: None Reported


Past Surgical History: Back Surgery, Bariatric Surgery, Heart Catheterization, 

Orthopedic Surgery


Additional Past Surgical History / Comment(s): Gastric bypass - 2000.  R rotator

 cuff repair X2.  R knee arthroscopy.  R carpal tunnel release. Cardiac cath- 

normal


Past Anesthesia/Blood Transfusion Reactions: No Reported Reaction, Motion 

Sickness


Past Psychological History: Anxiety


Smoking Status: Former smoker


Past Alcohol Use History: None Reported


Past Drug Use History: Marijuana





- Past Family History


  ** Father


Family Medical History: Hypertension


Additional Family Medical History / Comment(s): ETOH abuse





  ** Mother


Family Medical History: Dementia





<Marina Butler - Last Filed: 07/15/22 00:37>





General Exam


Limitations: no limitations (Well-developed, well-nourished male in no acute 

distress.  Initial temperature 97.9, pulse 66, respirations 15, blood pressure 

129/81, pulse ox 98% on room air.)


General appearance: alert, in no apparent distress


ENT exam: Present: normal exam, normal oropharynx


Neck exam: Present: normal inspection, full ROM.  Absent: tenderness, 

meningismus, lymphadenopathy


Respiratory exam: Present: normal lung sounds bilaterally.  Absent: respiratory 

distress, wheezes, rales, rhonchi, stridor


Cardiovascular Exam: Present: regular rate, normal rhythm, normal heart sounds. 

 Absent: systolic murmur, diastolic murmur, rubs, gallop, clicks


GI/Abdominal exam: Present: soft, tenderness (Left flank mildly tender upon 

palpation), normal bowel sounds.  Absent: distended, guarding, rebound, rigid


 exam: Present: scrotal swelling (left side scrotal swelling baseline for pt; 

non-erythematous, minimally uncomfortable), other (small, nontender, mobile 

lymph nodes palpable bilateral inguinal areas).  Absent: testicular tenderness, 

urethral discharge


Extremities exam: Present: normal inspection, full ROM, normal capillary refill.

  Absent: pedal edema


Back exam: Present: CVA tenderness (L)


Neurological exam: Present: alert, oriented X3, CN II-XII intact, normal gait


Psychiatric exam: Present: normal affect, normal mood


Skin exam: Present: warm, dry, intact, normal color.  Absent: rash





<Marina Butler - Last Filed: 07/15/22 00:37>





Course





<Marina Butler - Last Filed: 07/15/22 00:37>


                                   Vital Signs











  07/12/22 07/13/22





  23:11 06:08


 


Temperature 97.9 F 


 


Pulse Rate 66 54 L


 


Respiratory 15 16





Rate  


 


Blood Pressure 129/81 135/75


 


O2 Sat by Pulse 98 98





Oximetry  














- Reevaluation(s)


Reevaluation #1: 





07/13/22 01:30


Discussed this patient's care my attending, Dr. Moore. CT will be ordered 

without contrast due to chronic kidney disease and GFR 44. Patient is agreeable 

with this plan of care.





07/13/22 04:00


Patient asleep.  Appears to be resting comfortably.  CT pending.





07/13/22 04:56


Upon reassessment, patient is resting comfortably and is moving freely.  This 

patient's care will be handed off to my attending, Dr. Moore.








 (Marina Butler)





Medical Decision Making





- Lab Data


Result diagrams: 


                                 07/13/22 00:34





                                 07/13/22 00:34





<Lino Moore - Last Filed: 07/13/22 06:06>





- Lab Data


Result diagrams: 


                                 07/13/22 00:34





                                 07/13/22 00:34





- Radiology Data


Radiology results: report reviewed, image reviewed





<Marina Butler - Last Filed: 07/15/22 00:37>





- Medical Decision Making





This is a 67-year-old male with a past medical history of asthma, hypertension, 

liver disease, and hep C who presents to the emergency department for evaluation

 of ongoing left flank pain.  Patient reports known renal calculi, but states he

 is growing increasingly distended on the left side and is moderately unco

mfortable. Also complains of left sided scrotal swelling which has been constant

 since 2019.  Patient has not been compliant with his urological follow-up care.

  A thorough review of his chart does reveal previous finding of possible 

urinoma and uretral stricture. Discussed findings of laboratory studies 

including renal impairment which has been present for the past year. Health 

literacy is limited. Given Toradol and Zofran with improvement. Implored to 

follow up with urology as soon as possible to review findings from the visit and

 to develop a further plan of care. Remainder of disposition per Dr. Moore. 

([facility])





- Lab Data


                                   Lab Results











  07/12/22 07/13/22 07/13/22 Range/Units





  23:59 00:34 00:34 


 


WBC   7.3   (3.8-10.6)  k/uL


 


RBC   4.07 L   (4.30-5.90)  m/uL


 


Hgb   12.5 L   (13.0-17.5)  gm/dL


 


Hct   38.4 L   (39.0-53.0)  %


 


MCV   94.3   (80.0-100.0)  fL


 


MCH   30.8   (25.0-35.0)  pg


 


MCHC   32.6   (31.0-37.0)  g/dL


 


RDW   12.7   (11.5-15.5)  %


 


Plt Count   204   (150-450)  k/uL


 


MPV   8.4   


 


Neutrophils %   72   %


 


Lymphocytes %   18   %


 


Monocytes %   5   %


 


Eosinophils %   4   %


 


Basophils %   1   %


 


Neutrophils #   5.2   (1.3-7.7)  k/uL


 


Lymphocytes #   1.3   (1.0-4.8)  k/uL


 


Monocytes #   0.3   (0-1.0)  k/uL


 


Eosinophils #   0.3   (0-0.7)  k/uL


 


Basophils #   0.0   (0-0.2)  k/uL


 


Sodium    137  (137-145)  mmol/L


 


Potassium    4.4  (3.5-5.1)  mmol/L


 


Chloride    105  ()  mmol/L


 


Carbon Dioxide    25  (22-30)  mmol/L


 


Anion Gap    7  mmol/L


 


BUN    36 H  (9-20)  mg/dL


 


Creatinine    1.58 H  (0.66-1.25)  mg/dL


 


Est GFR (CKD-EPI)AfAm    51  (>60 ml/min/1.73 sqM)  


 


Est GFR (CKD-EPI)NonAf    44  (>60 ml/min/1.73 sqM)  


 


Glucose    92  (74-99)  mg/dL


 


Calcium    8.6  (8.4-10.2)  mg/dL


 


Total Bilirubin    0.2  (0.2-1.3)  mg/dL


 


AST    20  (17-59)  U/L


 


ALT    9  (4-49)  U/L


 


Alkaline Phosphatase    188 H  ()  U/L


 


Total Protein    6.5  (6.3-8.2)  g/dL


 


Albumin    3.8  (3.5-5.0)  g/dL


 


Urine Color  Yellow    


 


Urine Appearance  Clear    (Clear)  


 


Urine pH  5.5    (5.0-8.0)  


 


Ur Specific Gravity  1.019    (1.001-1.035)  


 


Urine Protein  1+ H    (Negative)  


 


Urine Glucose (UA)  Negative    (Negative)  


 


Urine Ketones  Negative    (Negative)  


 


Urine Blood  Small H    (Negative)  


 


Urine Nitrite  Negative    (Negative)  


 


Urine Bilirubin  Negative    (Negative)  


 


Urine Urobilinogen  <2.0    (<2.0)  mg/dL


 


Ur Leukocyte Esterase  Large H    (Negative)  


 


Urine RBC  15 H    (0-5)  /hpf


 


Urine WBC  31 H    (0-5)  /hpf


 


Urine WBC Clumps  Occasional H    (None)  /hpf


 


Urine Mucus  Rare H    (None)  /hpf














- Radiology Data





Continued interval progression of the low dense probably cystic structure or 

mass below the lower pole of the left kidney which could represent progressive 

urinoma, however, other cystic lesion or internal bleeding cannot be excluded as

 described. Neoplasm is in differential. Continued progression of markedly 

hyperdense/calcified structure in the left retroperitoneum as detailed above. 

Concerning for calcified soft tissue lesion or calcified neoplastic process. W

orsening local mass effect is present. Strict clinical correlation advised. 

(Marina Butler)





Disposition


Is patient prescribed a controlled substance at d/c from ED?: No





<Lino Moore - Last Filed: 07/13/22 06:06>


Is patient prescribed a controlled substance at d/c from ED?: No





<Marina Butler - Last Filed: 07/15/22 00:37>


Clinical Impression: 


 Left flank pain, Swelling of left testicle, Left kidney mass





Disposition: HOME SELF-CARE


Condition: Stable


Instructions (If sedation given, give patient instructions):  Flank Pain (ED)


Additional Instructions: 


Following up with urology is of utmost importance.


Your laboratory studies are unchanged from your baseline.


Return to the emergency department with any new, worsening, or concerning 

symptoms.





Referrals: 


Michi George MD [STAFF PHYSICIAN] - 1-2 days


Petra Salmeron DO [Primary Care Provider] - 1-2 days


Pravin Barnes MD [STAFF PHYSICIAN] - 1-2 days

## 2022-07-13 NOTE — CT
EXAMINATION TYPE: CT abdomen pelvis wo con

 

DATE OF EXAM: 7/13/2022

 

HISTORY: Left flank pain.

 

CT DLP: 424.2 mGycm.  Automated Exposure Control for Dose Reduction was Utilized.

 

TECHNIQUE:  CT scan of the abdomen and pelvis is performed without oral or IV contrast.

 

COMPARISON: CT abdomen and pelvis April 13, 2022 and older CTs

 

FINDINGS:  Within the limitations of a non-contrast study, the following observations are made.

 

LUNG BASES: Mild bibasilar linear scarring posteriorly redemonstrated.

 

LIVER/GB: Occasional tiny thin-walled cysts throughout the liver again seen.

 

PANCREAS: No significant abnormality is seen.

 

SPLEEN: No significant abnormality is seen.

 

ADRENALS: No significant abnormality is seen.

 

KIDNEYS: A few tiny calcifications centrally in the right kidney are redemonstrated suspicious for ca
lculi. No right-sided hydronephrosis. No significant change from prior.

 

Interval continued progression of the previously seen large lobulated cyst/cystic structure below the
 lower pole of the left kidney measuring approximately 16.4 x 15.1 cm axial image 61 axis versus 12.4
 x 10.2 cm axial image 31. Areas of heterogeneous hyperdensity redemonstrated. Persistent double-J le
ft ureteric stent, is deviated anteriorly similar to prior. Lobulated calcific density just medial, i
nferior rand there is redemonstrated measuring 7.9 x 6.4 cm axial image 88 slightly larger in size fr
om prior study anterior to this. Etiology uncertain but in the left retroperitoneum with continued in
creasing size and mass effect since December 27, 2021. Slow growing size suggests neoplasm though harshad
ology is not entirely certain. Correlate clinically.

 

BOWEL: Suboptimal evaluation of bowel without enteric contrast. No suspicious solid or cystic mass is
 seen. Surgical sutures from gastric bypass procedure epigastric region redemonstrated with mild/mode
rate distal esophageal dilatation.

 

GENITAL ORGANS: Enlarged prostate consistent with BPH redemonstrated.

 

LYMPH NODES: No greater than 1cm abdominal or pelvic lymph nodes are appreciated.

 

OSSEOUS STRUCTURES: Spine is straightened with ossific fusion at L2-L3 and L3-L4 levels. Advanced dis
c space narrowing with sclerosis and vacuum disc phenomenon at L4-L5 level. Moderate axial joint spac
e loss and acetabular spurring of both hips is present.

 

OTHER: No significant additional abnormality is seen.

 

IMPRESSION: Continued Interval progression of the low dense probable cystic structure or mass below t
he lower pole of the left kidney which could represent progressive a urinoma however other cystic les
ion or internal bleeding cannot  be excluded as described above. Neoplasm is in differential. Continu
ed progression of markedly hyperdense/calcified structure in the left retroperitoneum as detailed abo
ve. It is concerning for calcified soft tissue lesion or calcified neoplastic process. Worsening loca
l mass effect is present. Strict clinical correlation advised.

## 2022-10-21 ENCOUNTER — HOSPITAL ENCOUNTER (OUTPATIENT)
Dept: HOSPITAL 47 - RADPETMAIN | Age: 67
Discharge: HOME | End: 2022-10-21
Attending: INTERNAL MEDICINE
Payer: MEDICARE

## 2022-10-21 DIAGNOSIS — R19.00: Primary | ICD-10-CM

## 2022-10-21 PROCEDURE — 78815 PET IMAGE W/CT SKULL-THIGH: CPT

## 2022-10-25 NOTE — PE
[facility] medicine PET/CT

 

HISTORY: Renal cell carcinoma, initial

 

Patient received 11.9 mCi F-18 FDG intravenously and delayed scanning was performed from the skull ba
se to the mid thighs. Localization and attenuation correction CT was performed.

 

Correlation to CT scan 7/13/2022.

 

Average mediastinal uptake SUV 1.30, average liver uptake SUV 2.0

 

Neck and chest: There is no abnormal uptake. No evident pleural or pericardial effusion. No evident l
vadim mass. No cervical or supraclavicular adenopathy. No axillary adenopathy. Prominence of pulmonary 
artery may be indicative of pulmonary artery hypertension.

 

ABDOMEN: There is a large mass present in the left hemiabdomen, likely retroperitoneal SUV only 1.9-2
.3. Extensive irregular calcification is present in the central abdomen. Uptake is primarily along th
e areas of calcification. SUV is 4.8. There is a left ureteral stent in place which is displaced medi
ally and in a cephalad direction. Nonobstructive calcifications are present within the right kidney. 
Postop changes are noted, there is thickening of the distal esophagus, surgical clips are present at 
the gastroesophageal junction, is likely post gastric sleeve change.

 

Uptake along the bowel is felt likely to be physiologic. No pelvic adenopathy. Large hydrocele noted 
in the scrotum.

 

Osseous structures show no suspicious uptake. No evident lytic or blastic lesion. Degenerative disc c
hanges, spinal curvature, facet arthropathy noted in the visualized lumbar spine. Suspect postop arreola
ge to the right shoulder, correlate. Inflammatory change present in the right maxillary sinus, correl
ate for maxillary sinusitis.

 

IMPRESSION: Findings as described, origin of the mass is not identified with certainty, likely retrop
eritoneal.

## 2022-11-14 ENCOUNTER — HOSPITAL ENCOUNTER (OUTPATIENT)
Dept: HOSPITAL 47 - RADPROMAIN | Age: 67
Discharge: HOME | End: 2022-11-14
Attending: INTERNAL MEDICINE
Payer: MEDICARE

## 2022-11-14 VITALS — TEMPERATURE: 97.5 F

## 2022-11-14 VITALS — SYSTOLIC BLOOD PRESSURE: 135 MMHG | HEART RATE: 57 BPM | DIASTOLIC BLOOD PRESSURE: 82 MMHG | RESPIRATION RATE: 20 BRPM

## 2022-11-14 DIAGNOSIS — R19.09: Primary | ICD-10-CM

## 2022-11-14 LAB
INR PPP: 0.9 (ref ?–1.2)
PLATELET # BLD AUTO: 250 K/UL (ref 150–450)
PT BLD: 10.3 SEC (ref 9–12)

## 2022-11-14 PROCEDURE — 36415 COLL VENOUS BLD VENIPUNCTURE: CPT

## 2022-11-14 PROCEDURE — 77012 CT SCAN FOR NEEDLE BIOPSY: CPT

## 2022-11-14 PROCEDURE — 88342 IMHCHEM/IMCYTCHM 1ST ANTB: CPT

## 2022-11-14 PROCEDURE — 88305 TISSUE EXAM BY PATHOLOGIST: CPT

## 2022-11-14 PROCEDURE — 88341 IMHCHEM/IMCYTCHM EA ADD ANTB: CPT

## 2022-11-14 PROCEDURE — 49180 BIOPSY ABDOMINAL MASS: CPT

## 2022-11-14 PROCEDURE — 85610 PROTHROMBIN TIME: CPT

## 2022-11-14 PROCEDURE — 85049 AUTOMATED PLATELET COUNT: CPT

## 2022-11-14 NOTE — CT
EXAMINATION TYPE: CT biopsy abdomen percutaneous

 

DATE OF EXAM: 11/14/2022

 

COMPARISON: NONE

 

HISTORY: Large left retroperitoneal mass

 

CT DLP: 647mGycm

 

The procedure was explained to the patient.  The risks, complications, benefits, and alternatives wer
e discussed and any questions were answered.  Informed consent was obtained.  Patient was placed pron
e on the CT table and prepped and draped in the usual sterile fashion.  

 

All elements of maximal barrier and sterile technique utilized.

 

Utilizing CT guidance, an 18 gauge core biopsy needle access into a large left retroperitoneal mass w
as achieved and a single 18 gauge core sample was obtained.  The patient was stable throughout the pr
ocedure and remained stable upon discharge.

 

 

IMPRESSION: 

1. Successful 18 gauge core biopsy of the large left retroperitoneal mass.

## 2023-01-24 ENCOUNTER — HOSPITAL ENCOUNTER (EMERGENCY)
Dept: HOSPITAL 47 - EC | Age: 68
Discharge: HOME | End: 2023-01-24
Payer: MEDICARE

## 2023-01-24 VITALS — RESPIRATION RATE: 18 BRPM | TEMPERATURE: 97.4 F

## 2023-01-24 VITALS — SYSTOLIC BLOOD PRESSURE: 135 MMHG | DIASTOLIC BLOOD PRESSURE: 80 MMHG | HEART RATE: 79 BPM

## 2023-01-24 DIAGNOSIS — I10: ICD-10-CM

## 2023-01-24 DIAGNOSIS — Z91.048: ICD-10-CM

## 2023-01-24 DIAGNOSIS — Z91.018: ICD-10-CM

## 2023-01-24 DIAGNOSIS — F41.9: ICD-10-CM

## 2023-01-24 DIAGNOSIS — C76.2: Primary | ICD-10-CM

## 2023-01-24 DIAGNOSIS — K21.9: ICD-10-CM

## 2023-01-24 DIAGNOSIS — F12.90: ICD-10-CM

## 2023-01-24 DIAGNOSIS — F17.200: ICD-10-CM

## 2023-01-24 DIAGNOSIS — J45.909: ICD-10-CM

## 2023-01-24 DIAGNOSIS — Z79.899: ICD-10-CM

## 2023-01-24 LAB
ALBUMIN SERPL-MCNC: 4 G/DL (ref 3.5–5)
ALP SERPL-CCNC: 212 U/L (ref 38–126)
ALT SERPL-CCNC: 12 U/L (ref 4–49)
AMYLASE SERPL-CCNC: 103 U/L (ref 30–110)
ANION GAP SERPL CALC-SCNC: 7 MMOL/L
APTT BLD: 26.6 SEC (ref 22–30)
AST SERPL-CCNC: 21 U/L (ref 17–59)
BASOPHILS # BLD AUTO: 0 K/UL (ref 0–0.2)
BASOPHILS NFR BLD AUTO: 1 %
BUN SERPL-SCNC: 27 MG/DL (ref 9–20)
CALCIUM SPEC-MCNC: 8.8 MG/DL (ref 8.4–10.2)
CHLORIDE SERPL-SCNC: 106 MMOL/L (ref 98–107)
CO2 SERPL-SCNC: 25 MMOL/L (ref 22–30)
EOSINOPHIL # BLD AUTO: 0.2 K/UL (ref 0–0.7)
EOSINOPHIL NFR BLD AUTO: 4 %
ERYTHROCYTE [DISTWIDTH] IN BLOOD BY AUTOMATED COUNT: 4.07 M/UL (ref 4.3–5.9)
ERYTHROCYTE [DISTWIDTH] IN BLOOD: 14.1 % (ref 11.5–15.5)
GLUCOSE SERPL-MCNC: 150 MG/DL (ref 74–99)
HCT VFR BLD AUTO: 35.8 % (ref 39–53)
HGB BLD-MCNC: 11.4 GM/DL (ref 13–17.5)
HYALINE CASTS UR QL AUTO: 1 /LPF (ref 0–2)
INR PPP: 1 (ref ?–1.2)
LIPASE SERPL-CCNC: 104 U/L (ref 23–300)
LYMPHOCYTES # SPEC AUTO: 1 K/UL (ref 1–4.8)
LYMPHOCYTES NFR SPEC AUTO: 16 %
MCH RBC QN AUTO: 27.9 PG (ref 25–35)
MCHC RBC AUTO-ENTMCNC: 31.7 G/DL (ref 31–37)
MCV RBC AUTO: 87.9 FL (ref 80–100)
MONOCYTES # BLD AUTO: 0.3 K/UL (ref 0–1)
MONOCYTES NFR BLD AUTO: 5 %
NEUTROPHILS # BLD AUTO: 4.3 K/UL (ref 1.3–7.7)
NEUTROPHILS NFR BLD AUTO: 72 %
PH UR: 5.5 [PH] (ref 5–8)
PLATELET # BLD AUTO: 281 K/UL (ref 150–450)
POTASSIUM SERPL-SCNC: 4.3 MMOL/L (ref 3.5–5.1)
PROT SERPL-MCNC: 7.2 G/DL (ref 6.3–8.2)
PROT UR QL: (no result)
PT BLD: 10.3 SEC (ref 9–12)
RBC UR QL: 34 /HPF (ref 0–5)
SODIUM SERPL-SCNC: 138 MMOL/L (ref 137–145)
SP GR UR: 1.02 (ref 1–1.03)
UROBILINOGEN UR QL STRIP: <2 MG/DL (ref ?–2)
WBC # BLD AUTO: 6 K/UL (ref 3.8–10.6)
WBC #/AREA URNS HPF: 32 /HPF (ref 0–5)

## 2023-01-24 PROCEDURE — 74177 CT ABD & PELVIS W/CONTRAST: CPT

## 2023-01-24 PROCEDURE — 85025 COMPLETE CBC W/AUTO DIFF WBC: CPT

## 2023-01-24 PROCEDURE — 36415 COLL VENOUS BLD VENIPUNCTURE: CPT

## 2023-01-24 PROCEDURE — 99284 EMERGENCY DEPT VISIT MOD MDM: CPT

## 2023-01-24 PROCEDURE — 82150 ASSAY OF AMYLASE: CPT

## 2023-01-24 PROCEDURE — 96374 THER/PROPH/DIAG INJ IV PUSH: CPT

## 2023-01-24 PROCEDURE — 85730 THROMBOPLASTIN TIME PARTIAL: CPT

## 2023-01-24 PROCEDURE — 81001 URINALYSIS AUTO W/SCOPE: CPT

## 2023-01-24 PROCEDURE — 80053 COMPREHEN METABOLIC PANEL: CPT

## 2023-01-24 PROCEDURE — 83690 ASSAY OF LIPASE: CPT

## 2023-01-24 PROCEDURE — 85610 PROTHROMBIN TIME: CPT

## 2023-01-24 NOTE — ED
General Adult HPI





- General


Source: patient


Mode of arrival: ambulatory


Limitations: no limitations





<Tatiana Duarte - Last Filed: 01/24/23 10:30>





- General


Source: patient, RN notes reviewed, old records reviewed





<Marcello Salmeron - Last Filed: 01/24/23 15:58>





- General


Chief complaint: Abdominal Pain


Stated complaint: abd swelling & pain


Time Seen by Provider: 01/24/23 10:31





- History of Present Illness


Initial comments: 


67 year old  male with a history of newly diagnosed liposarcoma Cancer 

in the emergency department the chief complaint of worsening abdominal pain that

is tender to palpation.  (Tatiana Duarte)


Patient is a 67-year-old male with past medical history remarkable for 

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 

presents emergency Department complaining of some worsening abdominal distention

and pain over the last week or so.  Denies any nausea or vomiting.  Denies any 

chest pain, shortness breath.  He is due to follow-up with [facility] outpatient for further workup later this month in early next month.  

Presents today over concern for pain.  Is seeking pain relief.  Has no urinary 

complaints.  No other acute lites at this time.  Patient was initially seen in 

triage and quick note performed. (Marcello Salmeron)





- Related Data


                                Home Medications











 Medication  Instructions  Recorded  Confirmed


 


atenoloL [Atenolol] 50 mg PO DAILY 01/02/17 01/24/23


 


Albuterol Sulfate [Proair Hfa] 2 puff INHALATION RT-Q4H PRN 11/12/20 01/24/23


 


hydroCHLOROthiazide 12.5 mg PO DAILY 11/12/20 01/24/23


 


lisinopriL [Zestril] 40 mg PO DAILY 12/29/21 01/24/23


 


Omeprazole 40 mg PO DAILY PRN 01/24/23 01/24/23








                                  Previous Rx's











 Medication  Instructions  Recorded


 


HYDROcodone/APAP 5-325MG [Norco 1 tab PO Q6HR PRN 3 Days #12 tab 01/24/23





5-325]  











                                    Allergies











Allergy/AdvReac Type Severity Reaction Status Date / Time


 


alcohol Allergy  Anaphylaxis Verified 01/24/23 11:46


 


strawberry Allergy  Rash/Hives Verified 01/24/23 11:46














Review of Systems


ROS Other: All systems not noted in ROS Statement are negative.





<Tatiana Duarte - Last Filed: 01/24/23 10:30>


ROS Other: All systems not noted in ROS Statement are negative.





<Marcello Salmeron - Last Filed: 01/24/23 15:58>


ROS Statement: 


Those systems with pertinent positive or pertinent negative responses have been 

documented in the HPI.





Review of Systems:


CONST: Denies fever 


EYES: Denies blurry vision 


ENT: Denies nasal congestion  


C/V:  Denies Chest pain


RESP: Denies shortness of breath 


GI: Endorses abdominal pain


: Denies dysuria  


SKIN: Denies rash.


MSK: Denies joint pain.


NEURO: Denies headache  (Marcello Salmeron)





Past Medical History


Past Medical History: Asthma, GERD/Reflux, Hypertension, Liver Disease


Additional Past Medical History / Comment(s): Hepatitis C. Back pain/bulging 

discs/numbness down R leg.  GASTRIC ULCER, arthritis, kidney stone with failed 

lithotripsy X2, calcificed abdominal mass


History of Any Multi-Drug Resistant Organisms: None Reported


Past Surgical History: Back Surgery, Bariatric Surgery, Heart Catheterization, 

Orthopedic Surgery


Additional Past Surgical History / Comment(s): Gastric bypass - 2000.  R rotator

cuff repair X2.  R knee arthroscopy.  R carpal tunnel release. Cardiac cath- 

normal, lithrotripsy x2 failed.


Past Anesthesia/Blood Transfusion Reactions: No Reported Reaction, Motion 

Sickness


Past Psychological History: Anxiety


Smoking Status: Current some day smoker


Past Alcohol Use History: None Reported


Past Drug Use History: Marijuana





- Past Family History


  ** Father


Family Medical History: Hypertension


Additional Family Medical History / Comment(s): ETOH abuse





  ** Mother


Family Medical History: Dementia





<Tatiana Duarte - Last Filed: 01/24/23 10:30>





General Exam


Limitations: no limitations





<Tatiana Duarte - Last Filed: 01/24/23 10:30>





<Marcello Salmeron - Last Filed: 01/24/23 15:58>





- General Exam Comments


Initial Comments: 





General: Appears in no acute distress.


HEAD:  Normal with no signs of head trauma.


EYES:  PERRLA, EOMI, conjunctiva normal, no discharge.


ENT:  Hearing grossly intact, normal oropharynx.


RESPIRATORY:  Clear breath sounds bilaterally.  No wheezes, rales, or rhonchi.  


C/V:  Regular rate and rhythm. S1 and S2 auscultated, no edema, peripheral 

pulses 2+ and intact throughout


ABD: Soft, slightly tender to palpation mostly in the epigastric region.  

Nondistended.  No guarding.  No rebound tenderness.


EXT: Normal range of motion, no obvious deformity


SKIN:  No rashes or lesions observed on exposed skin.


NEURO: Alert and oriented 4.  No focal deficits. (Marcello Salmeron)





Course


                                   Vital Signs











  01/24/23 01/24/23 01/24/23





  09:58 13:00 14:33


 


Temperature 97.4 F L  


 


Pulse Rate 65 72 79


 


Respiratory 18 18 18





Rate   


 


Blood Pressure 148/78 143/80 135/80


 


O2 Sat by Pulse 99 99 99





Oximetry   














Medical Decision Making





- Lab Data


Result diagrams: 


                                 01/24/23 11:47





                                 01/24/23 11:47





<Marcello Salmeron - Last Filed: 01/24/23 15:58>





- Medical Decision Making





Patient is a 67-year-old male that presents complaining of intra-abdominal 

cancer.  He has worsening pain and presented is enlarged.  I did offer and CT 

imaging at this time as this has progressively gotten worse last week and he 

accepted.  We will obtain abdominal laboratory studies.  We did discuss goals of

care on this visit and he understands the pain will not be clearly resolved but 

he is hoping to get some relief.  Is due to follow up outpatient with his 

oncology team.  He was in agreement this plan.  Vital signs within except for 

limits.





Laboratory studies were remarkable for CK D which is chronic and unchanged from 

prior.  Remainder of labs are unremarkable.  Patient does have a chronic anemia.

 CT of the abdomen and pelvis does redemonstrate the retroperitoneal mass.  The 

radiology team believes it is somewhat enlarged at this time.





On reevaluation, patient is feeling somewhat improved following morphine.  I 

discussed his workup with him.  He would like to follow up outpatient.  I 

believe this is reasonable.  I will provide him with a prescription for Norco.  

He has contact information for follow-up.  Patient was in agreement this plan.





Patient and I discussed that his labs and imaging show an already known cancer 

that does appear to be enlarging which the patient is aware of.  Does have 

prearranged follow-up outpatient already for the next week in early February.  I

believe it is safe for him to be discharged home as long as he makes his 

appointments and he would like to go home.  He was in agreement with the plan.  

Strict return precautions were discussed.





I will provide the patient with a prescription for Norco 5. I instructed the 

patient to follow up with their PCP in the next 1-3 days.  I explained that the 

patient should return to the emergency department if they experience any 

worsening symptoms. Strict return precautions were discussed with the patient. 

The patient expressed understanding of these instructions. I answered all 

questions that the patient had. The patient was discharged home in fair 

condition with their prescriptions and follow up information.





Was pt. sent in by a medical professional or institution (, PA, NP, urgent 

care, hospital, or nursing home...) When possible be specific


@  -No


Did you speak to anyone other than the patient for history (EMS, parent, family,

police, friend...)? What history was obtained from this source 


@  -No


Did you review nursing and triage notes (agree or disagree)?  Why? 


@  -I reviewed and agree with nursing and triage notes


Were old charts reviewed (outside hosp., previous admission, EMS record, old 

EKG, old radiological studies, urgent care reports/EKG's, nursing home records)?

Report findings 


@  -Yes, old imaging was reviewed.  Specifically the PET scan from October 2022 

and CT from May 2022.


Differential Diagnosis (chest pain, altered mental status, abdominal pain women,

abdominal pain men, vaginal bleeding, weakness, fever, dyspnea, syncope, hea

dache, dizziness, GI bleed, back pain, seizure, CVA, palpatations, mental 

health)? 


@  -Abdominal pain secondary to cancer, worsening metastatic disease, this list 

is not all inclusive.


EKG interpreted by me (3pts min.).


@  -None done


X-rays interpreted by me (1pt min.).


@  -None done


CT interpreted by me (1pt min.).


@  -CT abdomen and pelvis redemonstrated the retroperitoneal mass.  Appears to 

be larger than previous.


U/S interpreted by me (1pt. min.).


@  -None done


What testing was considered but not performed or refused? (CT, X-rays, U/S, 

labs)? Why?


@  -None


What meds were considered but not given or refused? Why?


@  -None


Did you discuss the management of the patient with other professionals 

(professionals i.e. , PA, NP, lab, RT, psych nurse, , , 

teacher, , )? Give summary


@  -No


Was smoking cessation discussed for >3mins.?


@  -No


Was critical care preformed (if so, how long)?


@  -No


Were there social determinants of health that impacted care today? How? (Homeles

sness, low income, unemployed, alcoholism, drug addiction, transportation, low 

edu. Level, literacy, decrease access to med. care, FDC, rehab)?


@  -No


Was there de-escalation of care discussed even if they declined (Discuss DNR or 

withdrawal of care, Hospice)? DNR status


@  -No


What co-morbidities impacted this encounter? (DM, HTN, Smoking, COPD, CAD, 

Cancer, CVA, ARF, Chemo, Hep., AIDS, mental health diagnosis, sleep apnea, 

morbid obesity)?


@  -History of retroperitoneal cancer, liposarcoma


Was patient admitted / discharged? Hospital course, mention meds given and 

route, prescriptions, significant lab abnormalities, going to OR and other 

pertinent info.


@  -Discharged home for outpatient follow-up with his preestablished oncology 

team.  See above for emergency Department course.


Undiagnosed new problem with uncertain prognosis?


@  -No


Drug Therapy requiring intensive monitoring for toxicity (Heparin, Nitro, 

Insulin, Cardizem)?


@  -No


Were any procedures done?


@  -No


Diagnosis/symptom?


@  -Intra-abdominal cancer, liposarcoma


Acute, or Chronic, or Acute on Chronic?


@  -Acute on chronic


Uncomplicated (without systemic symptoms) or Complicated (systemic symptoms)?


@  -Complicated


Side effects of treatment?


@  -No


Exacerbation, Progression, or Severe Exacerbation?


@  -No


Poses a threat to life or bodily function? How? (Chest pain, USA, MI, pneumonia,

PE, COPD, DKA, ARF, appy, cholecystitis, CVA, Diverticulitis, Homicidal, 

Suicidal, threat to staff... and all critical care pts)


@  -Yes, if this progresses can result in significant morbidity and mortality.





Diagnosis/symptom?


@  -Abdominal pain secondary to cancer


Acute, or Chronic, or Acute on Chronic?


@  -Acute on chronic


Uncomplicated (without systemic symptoms) or Complicated (systemic symptoms)?


@  -Complicated


Side effects of treatment?


@  -none


Exacerbation, Progression, or Severe Exacerbation]


@  -no


Poses a threat to life or bodily function?


@  -Yes, if the cancer progresses can result in significant morbidity and 

mortality.








 (Marcello Salmeron)





- Lab Data


                                   Lab Results











  01/24/23 01/24/23 01/24/23 Range/Units





  11:47 11:47 11:47 


 


WBC  6.0    (3.8-10.6)  k/uL


 


RBC  4.07 L    (4.30-5.90)  m/uL


 


Hgb  11.4 L    (13.0-17.5)  gm/dL


 


Hct  35.8 L    (39.0-53.0)  %


 


MCV  87.9    (80.0-100.0)  fL


 


MCH  27.9    (25.0-35.0)  pg


 


MCHC  31.7    (31.0-37.0)  g/dL


 


RDW  14.1    (11.5-15.5)  %


 


Plt Count  281    (150-450)  k/uL


 


MPV  8.3    


 


Neutrophils %  72    %


 


Lymphocytes %  16    %


 


Monocytes %  5    %


 


Eosinophils %  4    %


 


Basophils %  1    %


 


Neutrophils #  4.3    (1.3-7.7)  k/uL


 


Lymphocytes #  1.0    (1.0-4.8)  k/uL


 


Monocytes #  0.3    (0-1.0)  k/uL


 


Eosinophils #  0.2    (0-0.7)  k/uL


 


Basophils #  0.0    (0-0.2)  k/uL


 


PT   10.3   (9.0-12.0)  sec


 


INR   1.0   (<1.2)  


 


APTT   26.6   (22.0-30.0)  sec


 


Sodium    138  (137-145)  mmol/L


 


Potassium    4.3  (3.5-5.1)  mmol/L


 


Chloride    106  ()  mmol/L


 


Carbon Dioxide    25  (22-30)  mmol/L


 


Anion Gap    7  mmol/L


 


BUN    27 H  (9-20)  mg/dL


 


Creatinine    1.51 H  (0.66-1.25)  mg/dL


 


Est GFR (CKD-EPI)AfAm    54  (>60 ml/min/1.73 sqM)  


 


Est GFR (CKD-EPI)NonAf    47  (>60 ml/min/1.73 sqM)  


 


Glucose    150 H  (74-99)  mg/dL


 


Calcium    8.8  (8.4-10.2)  mg/dL


 


Total Bilirubin    0.5  (0.2-1.3)  mg/dL


 


AST    21  (17-59)  U/L


 


ALT    12  (4-49)  U/L


 


Alkaline Phosphatase    212 H  ()  U/L


 


Total Protein    7.2  (6.3-8.2)  g/dL


 


Albumin    4.0  (3.5-5.0)  g/dL


 


Amylase    103  ()  U/L


 


Lipase    104  ()  U/L


 


Urine Color     


 


Urine Appearance     (Clear)  


 


Urine pH     (5.0-8.0)  


 


Ur Specific Gravity     (1.001-1.035)  


 


Urine Protein     (Negative)  


 


Urine Glucose (UA)     (Negative)  


 


Urine Ketones     (Negative)  


 


Urine Blood     (Negative)  


 


Urine Nitrite     (Negative)  


 


Urine Bilirubin     (Negative)  


 


Urine Urobilinogen     (<2.0)  mg/dL


 


Ur Leukocyte Esterase     (Negative)  


 


Urine RBC     (0-5)  /hpf


 


Urine WBC     (0-5)  /hpf


 


Urine Bacteria     (None)  /hpf


 


Hyaline Casts     (0-2)  /lpf


 


Urine Mucus     (None)  /hpf














  01/24/23 Range/Units





  13:00 


 


WBC   (3.8-10.6)  k/uL


 


RBC   (4.30-5.90)  m/uL


 


Hgb   (13.0-17.5)  gm/dL


 


Hct   (39.0-53.0)  %


 


MCV   (80.0-100.0)  fL


 


MCH   (25.0-35.0)  pg


 


MCHC   (31.0-37.0)  g/dL


 


RDW   (11.5-15.5)  %


 


Plt Count   (150-450)  k/uL


 


MPV   


 


Neutrophils %   %


 


Lymphocytes %   %


 


Monocytes %   %


 


Eosinophils %   %


 


Basophils %   %


 


Neutrophils #   (1.3-7.7)  k/uL


 


Lymphocytes #   (1.0-4.8)  k/uL


 


Monocytes #   (0-1.0)  k/uL


 


Eosinophils #   (0-0.7)  k/uL


 


Basophils #   (0-0.2)  k/uL


 


PT   (9.0-12.0)  sec


 


INR   (<1.2)  


 


APTT   (22.0-30.0)  sec


 


Sodium   (137-145)  mmol/L


 


Potassium   (3.5-5.1)  mmol/L


 


Chloride   ()  mmol/L


 


Carbon Dioxide   (22-30)  mmol/L


 


Anion Gap   mmol/L


 


BUN   (9-20)  mg/dL


 


Creatinine   (0.66-1.25)  mg/dL


 


Est GFR (CKD-EPI)AfAm   (>60 ml/min/1.73 sqM)  


 


Est GFR (CKD-EPI)NonAf   (>60 ml/min/1.73 sqM)  


 


Glucose   (74-99)  mg/dL


 


Calcium   (8.4-10.2)  mg/dL


 


Total Bilirubin   (0.2-1.3)  mg/dL


 


AST   (17-59)  U/L


 


ALT   (4-49)  U/L


 


Alkaline Phosphatase   ()  U/L


 


Total Protein   (6.3-8.2)  g/dL


 


Albumin   (3.5-5.0)  g/dL


 


Amylase   ()  U/L


 


Lipase   ()  U/L


 


Urine Color  Yellow  


 


Urine Appearance  Clear  (Clear)  


 


Urine pH  5.5  (5.0-8.0)  


 


Ur Specific Gravity  1.018  (1.001-1.035)  


 


Urine Protein  1+ H  (Negative)  


 


Urine Glucose (UA)  Negative  (Negative)  


 


Urine Ketones  Negative  (Negative)  


 


Urine Blood  Small H  (Negative)  


 


Urine Nitrite  Negative  (Negative)  


 


Urine Bilirubin  Negative  (Negative)  


 


Urine Urobilinogen  <2.0  (<2.0)  mg/dL


 


Ur Leukocyte Esterase  Moderate H  (Negative)  


 


Urine RBC  34 H  (0-5)  /hpf


 


Urine WBC  32 H  (0-5)  /hpf


 


Urine Bacteria  Rare H  (None)  /hpf


 


Hyaline Casts  1  (0-2)  /lpf


 


Urine Mucus  Rare H  (None)  /hpf














Disposition





<Tatiana Duarte - Last Filed: 01/24/23 10:30>


Is patient prescribed a controlled substance at d/c from ED?: Yes


When asked, does pt state using other controlled substances?: No


If prescribed controlled substance>3 days was MAPS reviewed?: Prescribed <3 Days


If opioid is for acute pain is fill amount 7 days or less?: Yes


If Rx opioid, was Start Talking consent form obtained?: Yes


Time of Disposition: 14:00





<Marcello Salmeron - Last Filed: 01/24/23 15:58>


Clinical Impression: 


 Abdominal malignancy, Chronic abdominal pain





Disposition: HOME SELF-CARE


Condition: Fair


Prescriptions: 


HYDROcodone/APAP 5-325MG [Norco 5-325] 1 tab PO Q6HR PRN 3 Days #12 tab


 PRN Reason: Pain


Referrals: 


Kenyon Hung MD [Primary Care Provider] - 1-2 days

## 2023-01-24 NOTE — CT
EXAMINATION TYPE: CT abdomen pelvis w con

 

DATE OF EXAM: 1/24/2023

 

COMPARISON: 4/13/2022 and more recent PET CT fusion 10/21/2022

 

HISTORY: Stomach pain

 

CT DLP: 897.8 mGycm

 

CONTRAST: 

CT scan of the abdomen and pelvis is performed without Oral Contrast and with IV Contrast, patient in
jected with 100 mL of Isovue 300.

 

FINDINGS: 

LUNG BASES-: No visible nodule.  No infiltrate. 

 

LIVER/GB:   No calcified gallstones.  No space occupying hepatic lesion. Biliary tree is of normal ca
liber. 

 

PANCREAS:  No inflammation.  No distinct mass. 

 

SPLEEN:  No splenic enlargement.  No lesion seen. 

 

ADRENALS:  No nodule.  No thickening. 

 

KIDNEYS/BLADDER: Enlarging infiltrative and lobulated retroperitoneal mass measures 24 cm x 23 cm x 1
9 cm and demonstrates interval enlargement. There is also medial calcified component measuring approx
imately 10.5 x 8.5 cm. Left-sided double pigtail catheter is noted with moderate to severe left-sided
 hydronephrosis. The left kidney is displaced laterally and cranially. There is mass effect upon the 
pancreas and stomach as well as adjacent small and large bowel.

 

BOWEL: There is mass effect upon the pancreas and stomach as well as adjacent small and large bowel. 
No obvious bowel obstruction.

 

GENITAL ORGANS:  No gross abnormality. 

 

LYMPH NODES:  No greater than 1cm abdominal or pelvic lymph nodes are appreciated.

 

AORTA: No significant abnormality. 

 

OSSEOUS STRUCTURES:  No significant abnormality is seen. 

 

OTHER:  No significant additional abnormality is seen. 

 

IMPRESSION: 

1. Enlarging left-sided retroperitoneal mass with displacement of the left kidney pancreas and adjace
nt bowel.

## 2023-01-26 ENCOUNTER — HOSPITAL ENCOUNTER (OUTPATIENT)
Dept: HOSPITAL 47 - RADCTMAIN | Age: 68
Discharge: HOME | End: 2023-01-26
Attending: INTERNAL MEDICINE
Payer: MEDICARE

## 2023-01-26 DIAGNOSIS — Z03.89: ICD-10-CM

## 2023-01-26 DIAGNOSIS — C49.9: Primary | ICD-10-CM

## 2023-01-26 DIAGNOSIS — R19.00: ICD-10-CM

## 2023-01-26 LAB — BUN SERPL-SCNC: 27 MG/DL (ref 9–20)

## 2023-01-26 PROCEDURE — 82565 ASSAY OF CREATININE: CPT

## 2023-01-26 PROCEDURE — 71260 CT THORAX DX C+: CPT

## 2023-01-26 PROCEDURE — 84520 ASSAY OF UREA NITROGEN: CPT

## 2023-01-26 PROCEDURE — 36415 COLL VENOUS BLD VENIPUNCTURE: CPT

## 2023-01-26 NOTE — CT
EXAMINATION TYPE: CT chest w con

 

DATE OF EXAM: 1/26/2023

 

COMPARISON: Chest CT May 13, 2022

 

HISTORY: Liposarcoma.

 

CT DLP: 594 mGycm.   Automated Exposure Control for Dose Reduction was Utilized.

 

TECHNIQUE:  CT scan of the thorax is performed following with IV Contrast, patient injected with 70cc
 mL of Isovue 300.  

 

FINDINGS:

 

LUNGS: The lungs remain grossly clear, there is no concerning new greater than 5 mm parenchymal mass 
or nodule identified.   There is no pleural effusion or pneumothorax seen.  The tracheobronchial tree
 is patent.

 

MEDIASTINUM: There are no new greater than 1 cm hilar or mediastinal lymph nodes.   No cardiomegaly o
r pericardial effusion is seen.  

 

OTHER: Surgical change at gastroesophageal junction is redemonstrated with stable small sized hiatal 
hernia and some dilatation and debris in the distal esophagus redemonstrated. There is partial visual
ization of large left-sided abdominal mass with significant local mass effect having some calcificati
on towards the midline of the posterior mid abdomen. Left kidney is now displaced anteriorly on curre
nt study. There is partial visualization of left ureter stent. Dextroconvex scoliosis centered at L3-
L4 level is redemonstrated. Spine is straightened on sagittal images.

 

IMPRESSION: No suspicious new thoracic nodules or masses. Large left abdominal heterogeneous mass wit
h local mass effect suspected neoplasm is partially imaged. Include Z78.9 (Other Specified Conditions Influencing Health Status) As An Associated Diagnosis?: No Anesthesia Volume In Cc: 0.5 Medical Necessity Information: It is in your best interest to select a reason for this procedure from the list below. All of these items fulfill various CMS LCD requirements except the new and changing color options. Consent: The patient's consent was obtained including but not limited to risks of crusting, scabbing, blistering, scarring, darker or lighter pigmentary change, recurrence, incomplete removal and infection. Medical Necessity Clause: This procedure was medically necessary because the lesions that were treated were: Treatment Number (Will Not Render If 0): 0 Detail Level: Detailed Post-Care Instructions: I reviewed with the patient in detail post-care instructions. Patient is to wear sunprotection, and avoid picking at any of the treated lesions. Pt may apply Vaseline to crusted or scabbing areas.

## 2023-06-30 ENCOUNTER — HOSPITAL ENCOUNTER (OUTPATIENT)
Dept: HOSPITAL 47 - RADCTMAIN | Age: 68
Discharge: HOME | End: 2023-06-30
Attending: INTERNAL MEDICINE
Payer: MEDICARE

## 2023-06-30 ENCOUNTER — HOSPITAL ENCOUNTER (OUTPATIENT)
Dept: HOSPITAL 47 - RADMRIMAIN | Age: 68
Discharge: HOME | End: 2023-06-30
Attending: INTERNAL MEDICINE
Payer: MEDICARE

## 2023-06-30 DIAGNOSIS — J47.9: ICD-10-CM

## 2023-06-30 DIAGNOSIS — K44.9: ICD-10-CM

## 2023-06-30 DIAGNOSIS — C78.7: ICD-10-CM

## 2023-06-30 DIAGNOSIS — C49.9: Primary | ICD-10-CM

## 2023-06-30 DIAGNOSIS — R91.8: ICD-10-CM

## 2023-06-30 DIAGNOSIS — I25.10: ICD-10-CM

## 2023-06-30 DIAGNOSIS — K80.20: ICD-10-CM

## 2023-06-30 PROCEDURE — 71250 CT THORAX DX C-: CPT

## 2023-06-30 PROCEDURE — 74181 MRI ABDOMEN W/O CONTRAST: CPT

## 2023-07-01 NOTE — CT
EXAMINATION TYPE: CT chest wo con

CT DLP: 749.7 mGycm, Automated exposure control for dose reduction was used.

 

DATE OF EXAM: 6/30/2023 3:20 PM

 

COMPARISON: 1/26/2023 CT

 

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
T TI; 

 

TECHNIQUE: Multiple axial images were obtained through the chest. Sagittal and coronal reformats were
 created for review. High resolution contiguous 1.5 mm axial images of the chest were obtained in a s
upine and prone position. Noncontiguous 1 mm axial images at 10 mm intervals were obtained in supine 
position in expiration. Coronal and sagittal reformatted images were obtained. No intravenous contras
t was administered.

Contrast used: mL of (None if empty)

Oral contrast used: (None if empty)

 

FINDINGS: 

LUNGS: Pulmonary nodules are visualized throughout the lungs the largest in the left upper lobe measu
ring 15 mm series 9 image 14 is new from 1/26/2023. Additionally right upper lobe medial pulmonary no
dule measuring 6 mm series 9 image 122 is also new. Smaller pulmonary nodule measuring 3 mm in the le
ft upper lobe also present image 80. There is no evidence of interstitial thickening, significant aleida
undglass opacity, honeycombing or architectural distortion in the lungs.  No acute area of infiltrati
ve or consolidative change.

LARGE AIRWAYS: Central airways are patent. There is bronchial wall thickening most pronounced in the 
lower lungs. There is mild bronchiectasis within the lung bases. A few opacified large airways most p
ronounced in right lower lobe suggestive retained secretions.

PLEURA: No pleural effusion or thickening. 

HEART: Size within normal limits.

MEDIASTINUM: No gross evidence of adenopathy. There is a large hiatal hernia present.

VASCULATURE:  No aortic aneurysm. 

MUSCULOSKELETAL: No acute osseous abnormalities

SOFT TISSUES/LYMPH NODES: Unremarkable.

LOWER NECK: No significant findings.

UPPER ABDOMEN: See MRI abdomen same day. Multiple metastatic disease sites are present within the jorge
er. Ostomy in the anterior abdominal wall noted. Left kidney is not definitively visualized. Previous
 large left masses no longer visualized and likely surgically absent.

 

IMPRESSION:

1.  New pulmonary nodules the largest in the left upper lobe measuring up to 15 mm concerning for met
astatic disease.

2.  Mild bronchial wall thickening and bronchiectasis suggestive of chronic bronchitis. 

3.  No evidence for interstitial lung disease related to connective tissue disorder.

4.  Moderate hiatal hernia.

5.  Mild coronary artery atherosclerosis.

## 2023-07-01 NOTE — MR
EXAMINATION TYPE: MR abdomen wo con

 

DATE OF EXAM: 6/30/2023 3:09 PM

 

INDICATION: 

Patient age:Male;  68 years old; 

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

 

COMPARISON: CT scan abdomen from 6/30/2023.

 

TECHNIQUE:  Multiplanar multi-sequence imaging was performed without contrast. 

IV Contrast: None

 

FINDINGS: 

LOWER CHEST: No gross irregularity.

 

ABDOMEN

Liver: Multiple (greater than 20) hepatic masses are seen throughout the liver the largest in the lef
t hepatic lobe measuring 7.1 x 5.5 and the right hepatic lobe near the dome measuring up to 5.6 x 5.1
 cm.

Gallbladder and Bile ducts: Layering gallstones versus biliary debris in the gallbladder lumen

Pancreas: Unremarkable. 

Spleen: Unremarkable.

Adrenal glands: The left adrenal gland is not well visualized. The right adrenal gland is unremarkabl
e.

Kidneys: Few scattered renal cysts are present in the right kidney. The left kidneys not visualized a
nd likely surgically absent.

Stomach and Bowel: Ostomy changes to the anterior abdominal wall. No evidence for obstruction.

Peritoneum:  Large abdominal mass seen on prior CT on 1/24/2023 appears to been surgically removed. T
here is a trace amount of fluid throughout the abdomen.

Vasculature: Unremarkable. No aortic aneurysm.

Musculoskeletal: The osseous structures appear intact. 

Lymph Nodes: Multiple lymph nodes are visualized. Along the aorta the largest measuring 31 x 27 mm di
ffusion weighted imaging series 504 image 152.

Abdominal wall: Ostomy changes of the abdominal wall.

 

IMPRESSION:

1.  Interval surgical removal/posttreatment changes of the large intra-abdominal mass. There is now m
ultiple (greater than 20) metastatic lesions throughout the liver and suspected retroperitoneal lymph
adenopathy.

2.  Small hiatal hernia.

3.  Biliary debris/cholelithiasis.